# Patient Record
Sex: FEMALE | Race: WHITE | NOT HISPANIC OR LATINO | Employment: OTHER | URBAN - METROPOLITAN AREA
[De-identification: names, ages, dates, MRNs, and addresses within clinical notes are randomized per-mention and may not be internally consistent; named-entity substitution may affect disease eponyms.]

---

## 2017-12-27 ENCOUNTER — OFFICE VISIT (OUTPATIENT)
Dept: URGENT CARE | Facility: CLINIC | Age: 27
End: 2017-12-27
Payer: COMMERCIAL

## 2017-12-27 DIAGNOSIS — J02.9 ACUTE PHARYNGITIS: ICD-10-CM

## 2017-12-27 LAB — S PYO AG THROAT QL: NEGATIVE

## 2017-12-27 PROCEDURE — 99283 EMERGENCY DEPT VISIT LOW MDM: CPT

## 2017-12-27 PROCEDURE — 87430 STREP A AG IA: CPT

## 2017-12-27 PROCEDURE — G0382 LEV 3 HOSP TYPE B ED VISIT: HCPCS

## 2018-01-06 NOTE — PROGRESS NOTES
Assessment   1  Sinus pain (478 19) (J34 89)   2  Sore throat (462) (J02 9)    Plan   Sore throat    · (1) THROAT CULTURE (CULTURE, UPPER RESPIRATORY); Status:Active; Requested    for:[id number];    · Rapid StrepA- POC; Source:Throat; Status:Complete;   Done: [id number] 12:50PM    Discussion/Summary   Discussion Summary:    Follow up with pcp meds as directed flonase and zyrtec daily and motrin for fever and pain in two days for throat culture results  Medication Side Effects Reviewed: Possible side effects of new medications were reviewed with the patient/guardian today  Understands and agrees with treatment plan: The treatment plan was reviewed with the patient/guardian  The patient/guardian understands and agrees with the treatment plan    Counseling Documentation With Imm: The patient was counseled regarding instructions for management,-- patient and family education,-- importance of compliance with treatment  Follow Up Instructions: Follow Up with your Primary Care Provider in 1-2 days  If your symptoms worsen, go to the nearest [place] [id number] Emergency Department  Chief Complaint   Chief Complaint Free Text Note Form: sinus pressure, sore throat      History of Present Illness   HPI: 33 yo female who is here today for sinus pressure, sore throat HA, fever, chills, and body aches and sore throat which is worse  Fevers have diminished and wants to make sure she doesn't have strep throat  She has been progressively getting worse and has left ear popping but the sore throat is the worst  She has been using tylenol cold and flu, mucinex, and has not had a flu shot this season  She states that the only thing getting worse is her sore throat and would like to be checked for strep  Hospital Based Practices Required Assessment:      Pain Assessment      the patient states they have pain  Abuse And Domestic Violence Screen       Yes, the patient is safe at home  -- The patient states no one is hurting them  Depression And Suicide Screen  No, the patient has not had thoughts of hurting themself  No, the patient has not felt depressed in the past 7 days  Review of Systems   Focused-Female:      Constitutional: as noted in HPI       ENT: as noted in HPI  Cardiovascular: no complaints of slow or fast heart rate, no chest pain, no palpitations, no leg claudication or lower extremity edema  Respiratory: no complaints of shortness of breath, no wheezing, no dyspnea on exertion, no orthopnea or PND  ROS Reviewed:    ROS reviewed  Social History    · Denied: History of Illicit drug use   · Never a smoker   · Social drinker (V49 89) (Z78 9)  Social History Reviewed: The social history was reviewed and updated today  The social history was reviewed and is unchanged  Surgical History   1  Denied: History Of Prior Surgery  Surgical History Reviewed: The surgical history was reviewed and updated today  Allergies   1  No Known Drug Allergies    Vitals   Signs   Recorded: [id number] 12:08PM   Temperature: 97 6 F  Heart Rate: 75  Respiration: 18  Systolic: 772  Diastolic: 72  Height: 5 ft 7 in  Weight: 122 lb   BMI Calculated: 19 11  BSA Calculated: 1 64  O2 Saturation: 98    Physical Exam        Constitutional      General appearance: No acute distress, well appearing and well nourished  Eyes      Conjunctiva and lids: No swelling, erythema or discharge  Pupils and irises: Equal, round and reactive to light  Ears, Nose, Mouth, and Throat      External inspection of ears and nose: Normal        Otoscopic examination: Tympanic membranes translucent with normal light reflex  Canals patent without erythema  Nasal mucosa, septum, and turbinates: Normal without edema or erythema  Oropharynx: Normal with no erythema, edema, exudate or lesions  Pulmonary      Respiratory effort: No increased work of breathing or signs of respiratory distress  Auscultation of lungs: Clear to auscultation  Cardiovascular      Auscultation of heart: Normal rate and rhythm, normal S1 and S2, without murmurs  Lymphatic      Palpation of lymph nodes in neck: No lymphadenopathy  Musculoskeletal      Gait and station: Normal        Digits and nails: Normal without clubbing or cyanosis  Inspection/palpation of joints, bones, and muscles: Normal        Skin      Skin and subcutaneous tissue: Normal without rashes or lesions  Neurologic      Cranial nerves: Cranial nerves 2-12 intact  Reflexes: 2+ and symmetric  Sensation: No sensory loss  Psychiatric      Orientation to person, place, and time: Normal        Mood and affect: Normal        Results/Data   Rapid StrepA- POC [id number] 12:50PM Doyal Riser      Test Name Result Flag Reference   Rapid Strep Negative          Signatures    Electronically signed by :  JENNIE Aburto; Dec 27 2017 12:50PM EST                       (Author)     Electronically signed by : Bandar Vo DO; Jan 5 2018  5:15PM EST                       (Co-author)

## 2018-01-23 VITALS
SYSTOLIC BLOOD PRESSURE: 108 MMHG | WEIGHT: 122 LBS | BODY MASS INDEX: 19.15 KG/M2 | HEIGHT: 67 IN | OXYGEN SATURATION: 98 % | TEMPERATURE: 97.6 F | RESPIRATION RATE: 18 BRPM | DIASTOLIC BLOOD PRESSURE: 72 MMHG | HEART RATE: 75 BPM

## 2018-09-20 ENCOUNTER — OFFICE VISIT (OUTPATIENT)
Dept: OBGYN CLINIC | Facility: CLINIC | Age: 28
End: 2018-09-20
Payer: COMMERCIAL

## 2018-09-20 VITALS
WEIGHT: 131 LBS | HEIGHT: 67 IN | BODY MASS INDEX: 20.56 KG/M2 | DIASTOLIC BLOOD PRESSURE: 70 MMHG | SYSTOLIC BLOOD PRESSURE: 112 MMHG | HEART RATE: 82 BPM

## 2018-09-20 DIAGNOSIS — G89.29 CHRONIC MIDLINE LOW BACK PAIN WITHOUT SCIATICA: Primary | ICD-10-CM

## 2018-09-20 DIAGNOSIS — M54.50 CHRONIC MIDLINE LOW BACK PAIN WITHOUT SCIATICA: Primary | ICD-10-CM

## 2018-09-20 PROCEDURE — 99213 OFFICE O/P EST LOW 20 MIN: CPT | Performed by: ORTHOPAEDIC SURGERY

## 2018-09-20 NOTE — PROGRESS NOTES
Assessment:     1  Chronic midline low back pain without sciatica          Plan:     Problem List Items Addressed This Visit        Other    Chronic midline low back pain without sciatica - Primary     77-year-old female with low thoracic back pain  Physical examination she has significant tightness in her hamstrings and I think this is likely contributing to her back pain  I emphasized the importance of regular, diligent stretching  She was given a script for physical therapy  I will see her back in the future as needed  Relevant Orders    Ambulatory referral to Physical Therapy           Patient ID: Morris Martínez is a 29 y o  female  Chief Complaint:  Back pain    HPI:  77-year-old female with a two-month history of pain in her low back  This started sometime in July  She points to the center aspect of her lower thoracic spine as to where the pain is  More recently has started radiating out a little bit  She finds that stretching does help relieve it and she does yoga fairly regularly  She has been trying to do more stretches knee yoga, but finds that when she does then it seems like she has not done any stretching for several months  She has not had any significant changes in activity  Sitting up straight gives her sharp pain  She has not had any specific injuries  She denies numbness or tingling  She denies bowel or bladder symptoms  Lying flat on a bed gives her the most relief  More she is up either standing or sitting the more pain she gets  Patient's medical intake was reviewed  Allergy:  No Known Allergies    Medications:  all current active meds have been reviewed    Past Medical History:  History reviewed  No pertinent past medical history  Past Surgical History:  History reviewed  No pertinent surgical history      Family History:  Family History   Problem Relation Age of Onset    Fibromyalgia Mother        Social History:  History   Alcohol Use    Yes     Comment: social     History   Drug Use No     History   Smoking Status    Never Smoker   Smokeless Tobacco    Never Used           ROS:  Review of Systems   Constitutional: Negative  HENT: Negative  Eyes: Negative  Respiratory: Negative  Gastrointestinal: Negative  Endocrine: Positive for polydipsia  Genitourinary: Negative  Musculoskeletal: Positive for arthralgias and myalgias  Skin: Negative  Allergic/Immunologic: Negative  Neurological: Positive for headaches  Hematological: Negative  Psychiatric/Behavioral: Negative  Objective:  BP Readings from Last 1 Encounters:   09/20/18 112/70      Wt Readings from Last 1 Encounters:   09/20/18 59 4 kg (131 lb)        BMI:   Estimated body mass index is 20 52 kg/m² as calculated from the following:    Height as of this encounter: 5' 7" (1 702 m)  Weight as of this encounter: 59 4 kg (131 lb)  EXAM:   Physical Exam   Constitutional: She is oriented to person, place, and time  She appears well-developed and well-nourished  No distress  HENT:   Head: Normocephalic and atraumatic  Eyes: Right eye exhibits no discharge  Left eye exhibits no discharge  Neck: Normal range of motion  Neck supple  No tracheal deviation present  Cardiovascular: Normal rate and regular rhythm  Pulmonary/Chest: Effort normal and breath sounds normal  No respiratory distress  She exhibits no tenderness  Abdominal: Soft  She exhibits no distension  There is no tenderness  Neurological: She is alert and oriented to person, place, and time  Skin: Skin is warm and dry  She is not diaphoretic  No erythema  Psychiatric: She has a normal mood and affect  Her behavior is normal    Vitals reviewed  Back Exam     Tenderness   Back tenderness location: Tender to palpation over the lower thoracic vertebrae, no surrounding half paraspinal tenderness      Range of Motion   Extension: normal   Flexion: abnormal   Lateral Bend Right: normal   Lateral Bend Left: normal   Rotation Right: normal   Rotation Left: normal     Muscle Strength   The patient has normal back strength  Tests   Straight leg raise right: negative  Straight leg raise left: negative    Reflexes   Patellar: normal  Achilles: normal    Other   Toe Walk: normal  Heel Walk: normal  Sensation: normal  Gait: normal   Erythema: no back redness    Comments:  She with tight hamstring muscles, no clonus on lateral bend to the left patient has pain in her right hip area, bilateral hip internal rotation is 20° with external rotation of 40°              Radiographs:  I have personally reviewed pertinent films in PACS and my interpretation is No bony abnormalities on the lumbar and thoracic x-rays

## 2018-09-20 NOTE — ASSESSMENT & PLAN NOTE
70-year-old female with low thoracic back pain  Physical examination she has significant tightness in her hamstrings and I think this is likely contributing to her back pain  I emphasized the importance of regular, diligent stretching  She was given a script for physical therapy  I will see her back in the future as needed

## 2018-10-11 ENCOUNTER — EVALUATION (OUTPATIENT)
Dept: PHYSICAL THERAPY | Facility: CLINIC | Age: 28
End: 2018-10-11
Payer: COMMERCIAL

## 2018-10-11 DIAGNOSIS — G89.29 CHRONIC MIDLINE LOW BACK PAIN WITHOUT SCIATICA: ICD-10-CM

## 2018-10-11 DIAGNOSIS — M54.50 CHRONIC MIDLINE LOW BACK PAIN WITHOUT SCIATICA: ICD-10-CM

## 2018-10-11 PROCEDURE — G8991 OTHER PT/OT GOAL STATUS: HCPCS | Performed by: PHYSICAL THERAPIST

## 2018-10-11 PROCEDURE — 97161 PT EVAL LOW COMPLEX 20 MIN: CPT | Performed by: PHYSICAL THERAPIST

## 2018-10-11 PROCEDURE — G8992 OTHER PT/OT  D/C STATUS: HCPCS | Performed by: PHYSICAL THERAPIST

## 2018-10-11 PROCEDURE — G8990 OTHER PT/OT CURRENT STATUS: HCPCS | Performed by: PHYSICAL THERAPIST

## 2018-10-11 NOTE — PROGRESS NOTES
PT Evaluation/Discharge  *Addendum 18: Pt has been non-complaint with therapy and has not been in contact to set up any future appointments, therefore she is being discharged at this time  Today's date: 10/11/2018  Patient name: Rosemarie Wilkes  : 1990  MRN: [id number]  Referring provider: Siomara Macdonald MD  Dx:   Encounter Diagnosis     ICD-10-CM    1  Chronic midline low back pain without sciatica M54 5 Ambulatory referral to Physical Therapy    G89 29                   Assessment  Impairments: abnormal or restricted ROM, activity intolerance, lacks appropriate home exercise program and pain with function    Assessment details: Pt is a 29year old female presenting to outpatient Physical Therapy with primary complaints of low back and right hip pain  Pt presents with pain limiting left lateral flexion, tight R sided hamstring muscles, TTP of the R glute medius, TTP of T6-L3 Spinous processes, and overall decreased functional mobility  These physical deficits are preventing the patient from participating in usual activities such as performing lifting for work duties, and her usual exercise program   Pt would benefit from skilled PT services in order to address these deficits and reach maximum level of function  Thank you kindly for the referral!  Barriers to therapy: None  Understanding of Dx/Px/POC: good   Prognosis: good    Goals  ST  The patient will be fully compliant with HEP within two weeks    2  Pt will report decrease in pain by at least two points on the VAS within two weeks  LT  The patient will increase FOTO score to 74 within eight weeks  2  The patient will report full return to her regular exercise program indicating return to functional baseline within eight weeks  3 Pt will report ability to perform all work duties within eight weeks      Plan  Patient would benefit from: skilled physical therapy  Planned modality interventions: thermotherapy: hydrocollator packs, cryotherapy, ultrasound, traction and TENS  Planned therapy interventions: therapeutic activities, therapeutic exercise, home exercise program, manual therapy, joint mobilization, balance, patient education, neuromuscular re-education, stretching and strengthening  Frequency: 2x week  Duration in weeks: 8  Treatment plan discussed with: patient        Subjective Evaluation    History of Present Illness  Date of onset: 2018  Mechanism of injury: Pt reports that her low back and her right hip began hurting insidiously three months ago  Pt notes that her pain started a month after 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 after taking some time off  She says that some days the pain feels better, but on Monday morning when she woke up the pain was very intense  Pt reports that she previously saw Dr Laird Cowden who prescribed stretching exercises for her hamstrings, which she says have only helped her to a point  Not a recurrent problem   Pain  Current pain ratin  At best pain ratin  At worst pain ratin  Quality: pressure, sharp and pulling  Relieving factors: rest (Laying down, Stretching )  Aggravating factors: lifting (Sitting<>standing transfers, bending)  Progression: worsening      Diagnostic Tests  X-ray: normal  Patient Goals  Patient goals for therapy: decreased pain, increased motion and return to sport/leisure activities  Patient goal: 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, to be able to lift groceries, return to usual exercise activity         Objective     Palpation     Right Tenderness of the gluteus medius  Tenderness     Lumbar Spine  Tenderness in the spinous process       Additional Tenderness Details  Pt demonstrates TTP to the spinous processes of T9-L3    Neurological Testing     Sensation     Lumbar   Left   Intact: light touch    Right   Intact: light touch    Reflexes   Left   Patellar (L4): trace (1+)  Achilles (S1): normal (2+)    Right   Patellar (L4): trace (1+)  Achilles (S1): normal (2+)    Active Range of Motion     Lumbar   Flexion: WFL  Extension: WFL  Left lateral flexion: WFL and with pain  Right lateral flexion: WFL  Left rotation: WFL  Right rotation: [facility]    Strength/Myotome Testing     Left Hip   Planes of Motion   Flexion: 4+  Extension: 5  Abduction: 5  External rotation: 4+  Internal rotation: 4    Right Hip   Planes of Motion   Flexion: 4+  Extension: 5  Abduction: 5  External rotation: 4  Internal rotation: 4    Left Knee   Flexion: 5  Extension: 5    Right Knee   Flexion: 5    Left Ankle/Foot   Dorsiflexion: 5  Plantar flexion: 5    Right Ankle/Foot   Dorsiflexion: 5  Plantar flexion: 5    Tests     Right Hip   Negative GIACOMO, FADIR and piriformis  90/90 SLR: Positive  SLR: Negative  Additional Tests Details  Pt demonstrates normal flexibility of B ITB  Pt performed R sided loaded hip extension, R lateral L/S extension in standing,  lateral L/S extension in standing, prone press-ups x10  with no change in symptoms, indicating that she does not likely have an extension based preference of the L/S  Pt reported mild decrease in pain with L lumbar lateral flexion following lateral trunk rotation, indicating that she may have a rotational preference of the L/S at this time               Precautions: None    Daily Treatment Diary     Manual              Long Axis Distraction L Hip                                                                     Exercise Diary              Treadmill             Hip flexor stretch with stool             Hamstring stretch with stool             Standing Hip 4-way             Lat Stretch             LTR             SLS             Bridges with Micheline Worthy Dogs             Abdominal Bracing with TB             Shoulder ext/Rows with TB                                                                                                                                      Modalities              TENS Lumbar Traction

## 2018-10-18 ENCOUNTER — APPOINTMENT (OUTPATIENT)
Dept: PHYSICAL THERAPY | Facility: CLINIC | Age: 28
End: 2018-10-18
Payer: COMMERCIAL

## 2019-07-02 ENCOUNTER — HOSPITAL ENCOUNTER (OUTPATIENT)
Dept: RADIOLOGY | Facility: HOSPITAL | Age: 29
Discharge: HOME | End: 2019-07-02
Attending: RADIOLOGY

## 2019-07-02 DIAGNOSIS — Z76.89 REFERRAL OF PATIENT WITHOUT EXAMINATION OR TREATMENT: ICD-10-CM

## 2019-07-30 ENCOUNTER — OFFICE VISIT (OUTPATIENT)
Dept: FAMILY MEDICINE CLINIC | Facility: CLINIC | Age: 29
End: 2019-07-30
Payer: COMMERCIAL

## 2019-07-30 VITALS
HEART RATE: 67 BPM | TEMPERATURE: 97.4 F | HEIGHT: 67 IN | DIASTOLIC BLOOD PRESSURE: 72 MMHG | BODY MASS INDEX: 20.65 KG/M2 | WEIGHT: 131.6 LBS | SYSTOLIC BLOOD PRESSURE: 110 MMHG | RESPIRATION RATE: 16 BRPM | OXYGEN SATURATION: 99 %

## 2019-07-30 DIAGNOSIS — Z00.00 ANNUAL PHYSICAL EXAM: Primary | ICD-10-CM

## 2019-07-30 DIAGNOSIS — Z11.1 TUBERCULOSIS SCREENING: ICD-10-CM

## 2019-07-30 PROCEDURE — 99385 PREV VISIT NEW AGE 18-39: CPT | Performed by: FAMILY MEDICINE

## 2019-07-30 PROCEDURE — 86580 TB INTRADERMAL TEST: CPT | Performed by: FAMILY MEDICINE

## 2019-07-30 PROCEDURE — 3725F SCREEN DEPRESSION PERFORMED: CPT | Performed by: FAMILY MEDICINE

## 2019-07-30 NOTE — PROGRESS NOTES
ADULT ANNUAL PHYSICAL  [facility]    NAME: Courtney Peterson  AGE: 34 y o  SEX: female  : 1990     DATE: 2019     Assessment and Plan:     Problem List Items Addressed This Visit     None      Visit Diagnoses     Annual physical exam    -  Primary    Tuberculosis screening        Relevant Orders    TB Skin Test          Immunizations and preventive care screenings were discussed with patient today  Appropriate education was printed on patient's after visit summary  Counseling:  Dental Health: discussed importance of regular tooth brushing, flossing, and dental visits  Injury prevention: discussed safety/seat belts, safety helmets, smoke detectors, carbon dioxide detectors, and smoking near bedding or upholstery  · Sexual health: discussed sexually transmitted diseases, partner selection, use of condoms, avoidance of unintended pregnancy, and contraceptive alternatives  Return in 6 months (on 2020), or if symptoms worsen or fail to improve  Chief Complaint:     Chief Complaint   Patient presents with    Physical Exam     34year old complete physical       History of Present Illness:     Adult Annual Physical   Patient here for a comprehensive physical exam  The patient reports no problems  Diet and Physical Activity  · Diet/Nutrition: well balanced diet, heart healthy (low sodium) diet, low carb diet, consuming 3-5 servings of fruits/vegetables daily, limited fruits/vegetables and adequate fiber intake  · Exercise: no formal exercise  Depression Screening  PHQ-9 Depression Screening    PHQ-9:    Frequency of the following problems over the past two weeks:       Little interest or pleasure in doing things:  0 - not at all  Feeling down, depressed, or hopeless:  0 - not at all  PHQ-2 Score:  0       General Health  · Sleep: sleeps well and gets 7-8 hours of sleep on average     · Hearing: normal - bilateral   · Vision: goes for regular eye exams and wears glasses  · Dental: brushes teeth three times daily and flosses teeth occasionally  /GYN Health  · Last menstrual period: 7/22/2019  · Contraceptive method: barrier methods  · History of STDs?: no      Review of Systems:     Review of Systems   Constitutional: Negative  Negative for activity change, chills, fatigue and fever  HENT: Negative  Negative for congestion, ear discharge, ear pain, sinus pressure and trouble swallowing  Eyes: Negative  Negative for photophobia, redness and visual disturbance  Respiratory: Negative  Negative for cough, shortness of breath and wheezing  Cardiovascular: Negative  Negative for chest pain, palpitations and leg swelling  Gastrointestinal: Negative  Negative for blood in stool, constipation, diarrhea and nausea  Endocrine: Negative  Genitourinary: Negative  Negative for dysuria, flank pain, hematuria and urgency  Musculoskeletal: Negative  Negative for arthralgias and myalgias  Skin: Negative  Negative for rash  Allergic/Immunologic: Positive for environmental allergies  Neurological: Negative  Negative for dizziness, syncope and numbness  Hematological: Bruises/bleeds easily  Psychiatric/Behavioral: Negative  Negative for decreased concentration, sleep disturbance and suicidal ideas  The patient is not nervous/anxious  Past Medical History:     History reviewed  No pertinent past medical history     Past Surgical History:     Past Surgical History:   Procedure Laterality Date    NO PAST SURGERIES        Social History:     Social History     Socioeconomic History    Marital status: Single     Spouse name: None    Number of children: None    Years of education: None    Highest education level: None   Occupational History    None   Social Needs    Financial resource strain: None    Food insecurity:     Worry: None     Inability: None    Transportation needs:     Medical: None Non-medical: None   Tobacco Use    Smoking status: Never Smoker    Smokeless tobacco: Never Used   Substance and Sexual Activity    Alcohol use: Yes     Comment: social    Drug use: No    Sexual activity: None   Lifestyle    Physical activity:     Days per week: None     Minutes per session: None    Stress: None   Relationships    Social connections:     Talks on phone: None     Gets together: None     Attends [employer] service: None     Active member of club or organization: None     Attends meetings of clubs or organizations: None     Relationship status: None    Intimate partner violence:     Fear of current or ex partner: None     Emotionally abused: None     Physically abused: None     Forced sexual activity: None   Other Topics Concern    None   Social History Narrative    None      Family History:     Family History   Problem Relation Age of Onset    Fibromyalgia Mother       Current Medications:     No current outpatient medications on file  No current facility-administered medications for this visit  Allergies:     No Known Allergies   Physical Exam:     /72 (BP Location: Left arm, Patient Position: Sitting, Cuff Size: Standard)   Pulse 67   Temp (!) 97 4 °F (36 3 °C) (Tympanic)   Resp 16   Ht 5' 7" (1 702 m)   Wt 59 7 kg (131 lb 9 6 oz)   LMP 07/22/2019 (Exact Date)   SpO2 99%   Breastfeeding? No   BMI 20 61 kg/m²     Physical Exam   Constitutional: She is oriented to person, place, and time  She appears well-developed and well-nourished  No distress  HENT:   Head: Normocephalic and atraumatic  Right Ear: External ear normal    Left Ear: External ear normal    Nose: Nose normal    Mouth/Throat: Oropharynx is clear and moist  No oropharyngeal exudate  Eyes: Pupils are equal, round, and reactive to light  Conjunctivae and EOM are normal  Right eye exhibits no discharge  Left eye exhibits no discharge  No scleral icterus  Neck: Normal range of motion  Neck supple  No thyromegaly present  Cardiovascular: Normal rate, regular rhythm, normal heart sounds and intact distal pulses  No murmur heard  Pulmonary/Chest: Effort normal and breath sounds normal  No respiratory distress  She has no wheezes  She has no rales  Abdominal: Soft  Bowel sounds are normal  She exhibits no distension and no mass  There is no tenderness  There is no rebound and no guarding  Musculoskeletal: Normal range of motion  She exhibits no edema, tenderness or deformity  Lymphadenopathy:     She has no cervical adenopathy  Neurological: She is alert and oriented to person, place, and time  She displays normal reflexes  No cranial nerve deficit or sensory deficit  She exhibits normal muscle tone  Coordination normal    Skin: Skin is warm and dry  Capillary refill takes less than 2 seconds  No rash noted  She is not diaphoretic  No erythema  No pallor  Psychiatric: She has a normal mood and affect  Her behavior is normal  Judgment and thought content normal    Nursing note and vitals reviewed        CRISSY Dimas

## 2019-07-30 NOTE — PATIENT INSTRUCTIONS

## 2019-08-01 ENCOUNTER — CLINICAL SUPPORT (OUTPATIENT)
Dept: FAMILY MEDICINE CLINIC | Facility: CLINIC | Age: 29
End: 2019-08-01

## 2019-08-01 DIAGNOSIS — Z11.1 ENCOUNTER FOR PPD SKIN TEST READING: Primary | ICD-10-CM

## 2019-08-01 LAB
INDURATION: 0 MM
TB SKIN TEST: NEGATIVE

## 2020-11-21 ENCOUNTER — OFFICE VISIT (OUTPATIENT)
Dept: URGENT CARE | Facility: CLINIC | Age: 30
End: 2020-11-21
Payer: COMMERCIAL

## 2020-11-21 VITALS
RESPIRATION RATE: 16 BRPM | HEIGHT: 67 IN | DIASTOLIC BLOOD PRESSURE: 64 MMHG | SYSTOLIC BLOOD PRESSURE: 132 MMHG | OXYGEN SATURATION: 100 % | BODY MASS INDEX: 20.72 KG/M2 | TEMPERATURE: 98.7 F | WEIGHT: 132 LBS | HEART RATE: 76 BPM

## 2020-11-21 DIAGNOSIS — B37.3 VULVOVAGINAL CANDIDIASIS: Primary | ICD-10-CM

## 2020-11-21 PROCEDURE — G0382 LEV 3 HOSP TYPE B ED VISIT: HCPCS | Performed by: PHYSICIAN ASSISTANT

## 2020-11-21 PROCEDURE — 99283 EMERGENCY DEPT VISIT LOW MDM: CPT | Performed by: PHYSICIAN ASSISTANT

## 2020-11-21 RX ORDER — FLUCONAZOLE 150 MG/1
150 TABLET ORAL ONCE
Qty: 1 TABLET | Refills: 0 | Status: SHIPPED | OUTPATIENT
Start: 2020-11-21 | End: 2020-11-21

## 2021-04-09 DIAGNOSIS — Z23 ENCOUNTER FOR IMMUNIZATION: ICD-10-CM

## 2024-09-18 ENCOUNTER — TELEPHONE (OUTPATIENT)
Age: 34
End: 2024-09-18

## 2024-09-18 NOTE — TELEPHONE ENCOUNTER
Contacted patient in regards to email received by Izabel NELSON in attempts to verify patient's needs of services and add patient to proper wait list. Spoke to patient who stated that she was walking into a meeting and can give us a call back once she's done. IC provided her the call back number.    Attempt #1

## 2024-11-07 ENCOUNTER — ULTRASOUND (OUTPATIENT)
Dept: OBGYN CLINIC | Facility: CLINIC | Age: 34
End: 2024-11-07
Payer: COMMERCIAL

## 2024-11-07 VITALS
HEIGHT: 67 IN | SYSTOLIC BLOOD PRESSURE: 110 MMHG | WEIGHT: 140 LBS | BODY MASS INDEX: 21.97 KG/M2 | DIASTOLIC BLOOD PRESSURE: 62 MMHG

## 2024-11-07 DIAGNOSIS — N91.2 AMENORRHEA: Primary | ICD-10-CM

## 2024-11-07 PROCEDURE — 76817 TRANSVAGINAL US OBSTETRIC: CPT | Performed by: NURSE PRACTITIONER

## 2024-11-07 PROCEDURE — 99213 OFFICE O/P EST LOW 20 MIN: CPT | Performed by: NURSE PRACTITIONER

## 2024-11-07 NOTE — PATIENT INSTRUCTIONS
Please call with any concerns  Schedule 12 week ultrasound with MFM  A flu vaccine is recommended in pregnancy. You can get this through your PCP, any pharmacy or here in our office.  Return in 2 weeks for OB intake visit.  4 weeks for initial prenatal with provider.

## 2024-11-07 NOTE — PROGRESS NOTES
"[facility] - [place]  [address] [place] [address]    Assessment/Plan:  1. Amenorrhea  -     AMB US OB < 14 weeks single or first gestation level 1  -     Ambulatory Referral to Maternal Fetal Medicine; Future; Expected date: 2024  8 weeks 0 days, RAFFI 2025 by LMP, confirmed by US  Reviewed safe meds list  MFM referral initiated  Call with any concerns  Return visit in 2 weeks for OB intake          Subjective:   Ashleigh Lambert is a 34 y.o.  female.    HPI:   34 year old  presents to office with amenorrhea and + HPT. LMP 2024, placing her at 8 weeks 0 days gestation. Normally has regular cycles that are monthly. Feels well, mild cramping, no bleeding. Concerned about ectopic due to hx PID age 19.         Gyn History  Patient's last menstrual period was 2024 (exact date).       Last pap smear: Not on file    She  reports being sexually active and has had partner(s) who are male. She reports using the following method of birth control/protection: None.       OB History      Past Medical History:  No date: Ovarian cyst  No date: PID (acute pelvic inflammatory disease)  No date: Urinary tract infection  No date: Varicella  No date: Yeast infection     Past Surgical History:  No date: NO PAST SURGERIES     Social History     Tobacco Use    Smoking status: Never    Smokeless tobacco: Never   Vaping Use    Vaping status: Never Used   Substance Use Topics    Alcohol use: Not Currently     Comment: social    Drug use: No        No current outpatient medications on file.    She has No Known Allergies..    ROS: Review of Systems See HPI for details otherwise negative    Objective:  /62 (BP Location: Left arm, Patient Position: Sitting, Cuff Size: Standard)   Ht 5' 6.5\" (1.689 m)   Wt 63.5 kg (140 lb)   LMP 2024 (Exact Date)   Breastfeeding No   BMI 22.26 kg/m²      Physical Exam  Constitutional:       General: She is not in acute distress.     Appearance: " Normal appearance. She is not ill-appearing.   HENT:      Head: Normocephalic and atraumatic.   Abdominal:      General: There is no distension.      Palpations: Abdomen is soft.      Tenderness: There is no abdominal tenderness.   Musculoskeletal:         General: No swelling.   Skin:     General: Skin is warm and dry.   Neurological:      Mental Status: She is alert.   Psychiatric:         Thought Content: Thought content normal.       TVUS demonstrates a SIUP measuring 8 weeks 2 days by CRL, +CM noted.

## 2024-11-07 NOTE — PROGRESS NOTES
Ultrasound Probe Disinfection    A transvaginal ultrasound was performed.   Prior to use, disinfection was performed with High Level Disinfection Process ([employer])  Probe serial number [id number]:  [id number] was used    Nini Anderson MA  11/07/24  3:41 PM

## 2024-11-08 ENCOUNTER — TELEPHONE (OUTPATIENT)
Age: 34
End: 2024-11-08

## 2024-11-11 NOTE — TELEPHONE ENCOUNTER
Pt is scheduled for her Nuchal US 12/9 @ 2:30. Confirmed address of [facility].

## 2024-11-26 ENCOUNTER — TELEPHONE (OUTPATIENT)
Dept: OBGYN CLINIC | Facility: CLINIC | Age: 34
End: 2024-11-26

## 2024-11-26 ENCOUNTER — INITIAL PRENATAL (OUTPATIENT)
Dept: OBGYN CLINIC | Facility: CLINIC | Age: 34
End: 2024-11-26

## 2024-11-26 VITALS
DIASTOLIC BLOOD PRESSURE: 60 MMHG | BODY MASS INDEX: 21.79 KG/M2 | WEIGHT: 138.8 LBS | SYSTOLIC BLOOD PRESSURE: 100 MMHG | HEIGHT: 67 IN

## 2024-11-26 DIAGNOSIS — Z31.430 ENCOUNTER OF FEMALE FOR TESTING FOR GENETIC DISEASE CARRIER STATUS FOR PROCREATIVE MANAGEMENT: ICD-10-CM

## 2024-11-26 DIAGNOSIS — Z34.81 PRENATAL CARE, SUBSEQUENT PREGNANCY, FIRST TRIMESTER: Primary | ICD-10-CM

## 2024-11-26 PROCEDURE — NOBC: Performed by: NURSE PRACTITIONER

## 2024-11-26 NOTE — TELEPHONE ENCOUNTER
Patient informed of incorrect previous records scanned into her chart.  Will await her previous records.  She will verify insurance coverage for CF & SMA carrier screening with [facility].  She may decide to await records to see if testing done previously.  Lab order for [employer] CF/SMA carrier screening mailed to patient.

## 2024-11-26 NOTE — TELEPHONE ENCOUNTER
Left message x 2 patient's VM to recall office to discuss previous medical records.  Please transfer call to office (Amanda LE RN).

## 2024-11-26 NOTE — PROGRESS NOTES
"  OB INTAKE INTERVIEW  Patient is 34 y.o. who presents for OB intake at 10 wks 5 days  She is accompanied by herself during this encounter  The father of her baby (Josue Martinez \"Antoni\") is involved in the pregnancy and is 37 years old.      Last Menstrual Period: 2024  Ultrasound: Measured 8 weeks 2 days on 2024  Estimated Date of Delivery: 2025 confirmed by US    Signs/Symptoms of Pregnancy  Current pregnancy symptoms: nausea (decreased now), no vomiting, fatigue, urinary frequency, breast tenderness  Constipation YES - increase hydration, Miralax, Colace prn  Headaches YES x 2- not migraine-type presently, sometimes Tylenol effective  Cramping/spotting YES - cramping, pressure, no vaginal bleeding  PICA cravings no    Diabetes-  There is no height or weight on file to calculate BMI.  If patient has 1 or more, please order early 1 hour GTT  History of GDM no  BMI >35 no  History of PCOS or current metformin use no  History of LGA/macrosomic infant (4000g/9lbs) no    If patient has 2 or more, please order early 1 hour GTT  BMI>30 no  AMA YES  First degree relative with type 2 diabetes no  History of chronic HTN, hyperlipidemia, elevated A1C no  High risk race (, , ,  or ) no    Hypertension- if you answer yes to any of the following, please order baseline preeclampsia labs (cbc, comprehensive metabolic panel, urine protein creatinine ratio, uric acid)  History of of chronic HTN no  History of gestational HTN no  History of preeclampsia, eclampsia, or HELLP syndrome no  History of diabetes no  History of lupus,sjogrens syndrome, kidney disease no    Thyroid- if yes order TSH with reflex T4  History of thyroid disease no    Bleeding Disorder or Hx of DVT-patient or first degree relative with history of. Order the following if not done previously.   (Factor V, antithrombin III, prothrombin gene mutation, protein C and S Ag, lupus " anticoagulant, anticardiolipin, beta-2 glycoprotein)   no    OB/GYN-  History of abnormal pap smear no       Date of last pap smear 2023 (wnl per patient - [place])  History of HPV no  History of Herpes/HSV no  History of other STI (gonorrhea, chlamydia, trich) no  History of prior  YES  History of prior  no  History of  delivery prior to 36 weeks 6 days no  History of Varicella or Vaccination [t had chickenpox in childhood  History of blood transfusion no  Ok for blood transfusion YES    Substance screening-   History of tobacco use no  Currently using tobacco no  Substance Use Screen Level (N/A, LOW, HIGH) N/A    MRSA Screening-   Does the pt have a hx of MRSA? no    Immunizations:  Influenza vaccine given this season NO - patient usually does not get flu vaccine - recommended in pregnancy  Discussed Tdap vaccine YES  Discussed COVID Vaccine - patient had Covid vaccine x 2 + boosters (up to date), patient had Covid x 1    Genetic/MFM-  Do you or your partner have a history of any of the following in yourselves or first degree relatives?  Cystic fibrosis no  Spinal muscular atrophy no  Hemoglobinopathy/Sickle Cell/Thalassemia no  Fragile X Intellectual Disability no    If yes, discuss Carrier Screening and recommend consultation with MFM/Genetic Counseling and place specific [facility] Referral for.    If no, discuss Carrier Screening being completed once in a lifetime as a standard of care lab test. Place orders for Cystic Fibrosis Gene Test (OEN314) and Spinal Muscular Atrophy DNA (FCV2552    Appointment for Nuchal Translucency Ultrasound at [facility] scheduled for 2024 - discussed NIPT/MSAFP      Interview education  [facility] P[facility] Essentials Book reviewed, discussed and attached to their AVS YES    Nurse/Family Partnership- patient may qualify; referral placed NO    Prenatal lab work scripts YES ([facility])  Extra labs ordered:  CF/SMA carrier screening    Aspirin/Preeclampsia  Screen    Risk Level Risk Factor Recommendation   LOW Prior Uncomplicated full-term delivery YES No Aspirin recommendation        MODERATE Nulliparity no Recommend low-dose aspirin if     BMI>30 no 2 or more moderate risk factors    Family History Preeclampsia (mother/sister) no     35yr old or greater YES     Black Race, Concern for SDOH/Low Socioeconomic no     IVF Pregnancy  no     Personal History Risks (low birth weight, prior adverse preg outcome, >10yr preg interval) YES         HIGH History of Preeclampsia no Recommend low-dose aspirin if     Multifetal gestation no 1 or more high risk factors    Chronic HTN no     Type 1 or 2 Diabetes no     Renal Disease no     Autoimmune Disease  no      Contraindications to ASA therapy:  NSAID/ ASA allergy: no  Nasal polyps: no  Asthma with history of ASA induced bronchospasm: no  Relative contraindications:  History of GI bleed: no  Active peptic ulcer disease: no  Severe hepatic dysfunction: no    Patient should be recommended to take ASA 162mg during this pregnancy from 12-36wks to lower her risk of preeclampsia: MODERATE RISK per screening protocol - recommended LDASA 162 mg/day from wk 12-36          The patient has a history now or in prior pregnancy notable for:    - long interval pregnancy - 1st  3/6/2010 ([place]) - previous records from [place] scanned into chart are not this patient's records - different middle name & different , different pregnancy/delivery records - office clerical staff (Ashkan) notified 2024 by MIMI Aguilar  - 1st pregnancy with FOB (his first)  - hx PID (age 19)  - hx migraine h/a      Details that I feel the provider should be aware of:   - patient is up to date on q 6 month dental cleanings  - plans air travel (work) [facility] 3/2025 - [place]  - 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 The patient was oriented to our  practice, the navigator role, reviewed delivering physicians and [facility] for Delivery. All questions were answered.    Interviewed by: LIVIA Hodge RN

## 2024-11-26 NOTE — PATIENT INSTRUCTIONS
Congratulation!! Please review our Pregnancy Essential Guide and [facility] L&D Virtual tour from our networks website.     [facility] Guide  [facility] ([email or web address])     [facility] - Virtual Tour ([employer])  Yemi Sotelo,     It was so nice getting to know you today. Remember if you have an urgent or time sensitive concern, please call the practice phone number so a clinical triage team member can review your symptoms and get in touch with our on call provider if necessary. If you have general questions or need help navigating our services please REPLY to this message so it comes directly to me and I will respond between other patients. If I am out of the office for any reason, another nurse navigator may reach out to help you. Our Pregnancy Essential Guide is a great online resource--please use the link below.     [facility] P[facility]entials Guide  [facility] ([email or web address])     Again, Congratulations and Thank You for choosing [facility]. I look forward to helping you through this journey. Reach out -   D JAZMÍN Hodge

## 2024-12-02 ENCOUNTER — TELEPHONE (OUTPATIENT)
Age: 34
End: 2024-12-02

## 2024-12-02 NOTE — TELEPHONE ENCOUNTER
Pt called in stating she has appointment for NT at [facility] on 12/9/24 and then her initial OB visit at [facility] the following day. Wondering if she will be getting US at both. Advised the NT will be an US but at the OB office, she will get dopplers for fetal heart tones. She verbalized understanding and is thankful. Pt wondering about costs for various procedures, provided information. States her insurance will not cover the NIPT and will cost over $700, reviewed it is an optional test. She verbalized understanding and is thankful.

## 2024-12-07 LAB
EXTERNAL ANTIBODY SCREEN: NORMAL
EXTERNAL HEMOGLOBIN: 12.1 G/DL
EXTERNAL HEPATITIS B SURFACE ANTIGEN: NEGATIVE
EXTERNAL HIV-1/2 AB-AG: NORMAL
EXTERNAL PLATELET COUNT: 238 K/ΜL
EXTERNAL RH FACTOR: NEGATIVE
EXTERNAL RUBELLA IGG QUANTITATION: 2.71
EXTERNAL SYPHILIS TOTAL IGG/IGM SCREENING: NON REACTIVE

## 2024-12-09 ENCOUNTER — ROUTINE PRENATAL (OUTPATIENT)
Dept: PERINATAL CARE | Facility: OTHER | Age: 34
End: 2024-12-09
Payer: COMMERCIAL

## 2024-12-09 VITALS
HEIGHT: 67 IN | HEART RATE: 74 BPM | DIASTOLIC BLOOD PRESSURE: 64 MMHG | WEIGHT: 141.8 LBS | SYSTOLIC BLOOD PRESSURE: 126 MMHG | BODY MASS INDEX: 22.26 KG/M2

## 2024-12-09 DIAGNOSIS — Z36.82 ENCOUNTER FOR NUCHAL TRANSLUCENCY TESTING: ICD-10-CM

## 2024-12-09 DIAGNOSIS — O09.521 SUPERVISION OF ELDERLY MULTIGRAVIDA IN FIRST TRIMESTER: Primary | ICD-10-CM

## 2024-12-09 DIAGNOSIS — N91.2 AMENORRHEA: ICD-10-CM

## 2024-12-09 DIAGNOSIS — O09.521 MULTIGRAVIDA OF ADVANCED MATERNAL AGE IN FIRST TRIMESTER: ICD-10-CM

## 2024-12-09 DIAGNOSIS — Z3A.12 12 WEEKS GESTATION OF PREGNANCY: ICD-10-CM

## 2024-12-09 PROCEDURE — 76813 OB US NUCHAL MEAS 1 GEST: CPT | Performed by: OBSTETRICS & GYNECOLOGY

## 2024-12-09 PROCEDURE — 76801 OB US < 14 WKS SINGLE FETUS: CPT | Performed by: OBSTETRICS & GYNECOLOGY

## 2024-12-09 PROCEDURE — 99203 OFFICE O/P NEW LOW 30 MIN: CPT | Performed by: OBSTETRICS & GYNECOLOGY

## 2024-12-09 PROCEDURE — 36415 COLL VENOUS BLD VENIPUNCTURE: CPT | Performed by: OBSTETRICS & GYNECOLOGY

## 2024-12-09 RX ORDER — ASPIRIN 81 MG/1
81 TABLET, CHEWABLE ORAL DAILY
COMMUNITY

## 2024-12-09 NOTE — PROGRESS NOTES
"Mindy presents today for a genetic screening ultrasound.  This is her second pregnancy.  First pregnancy resulted in a full-term vaginal livery in 2010 without apparent complications.  She has no other significant contributory medical, surgical, substance use, or family history.  A review of systems is otherwise negative.    We discussed the options for genetic screening, including but not limited to first trimester screening, second trimester screening, combined first and second trimester screening, noninvasive prenatal screening (NIPS) for patients at high risk and diagnostic screening through the use of CVS and amniocentesis. We discussed the risks and benefits of each approach including the sensitivities and false positive rates as well as the difference between a screening test and a diagnostic test. At the conclusion of our discussion the patient elected noninvasive prenatal screening utilizing the MaterniT 21 plus test. The patient had this bloowork drawn in the office.   The results should be available in approximately 7-10 days.    We discussed follow-up in detail and I recommend an anatomy ultrasound be scheduled for 20 weeks gestation.    Thank you very much for allowing us to participate in the care of this very nice patient. Should you have any questions, please do not hesitate to contact me.    Portions of the record may have been created with voice recognition software. Occasional wrong word or \"sound a like\" substitutions may have occurred due to the inherent limitations of voice recognition software. Read the chart carefully and recognize, using context, where substitutions have occurred.  "

## 2024-12-09 NOTE — PROGRESS NOTES
Patient chose to have [facility] [id number] Non-Invasive Prenatal Screen [id number] PLUS w/ SCA, WITH fetal sex (per pt request).  Patient choose to be billed through insurance. Prior Authorization approval #[id number].    Patient given brochure and is aware [facility] will contact patient's insurance and coordinate coverage.  Provided [facility] contact information. General inquiries [phone], Cost estimates [phone] and [facility] Billing [phone]. Website [email or web address].     Blood collection tubes labeled with patient identifiers (name, medical record number, and date of birth).     Filled out [facility] order form. Patient chose to have blood drawn in our office at time of visit. NIPS was drawn from right arm with a butterfly needle by A Anthony NOYOLA. .      If patient chose to have blood work drawn at a [facility] lab we requested patient notify MFM (via phone call or [employer] message) when blood collected so office can follow up on results.       Maternal Fetal Medicine will have results in approximately 5-7 business days and will call patient or notify via [employer].  Patient aware viewing lab result online will reveal fetal sex if ordered.    Patient verbalized understanding of all instructions and no questions at this time. ;

## 2024-12-10 ENCOUNTER — INITIAL PRENATAL (OUTPATIENT)
Dept: OBGYN CLINIC | Facility: CLINIC | Age: 34
End: 2024-12-10

## 2024-12-10 VITALS
HEIGHT: 67 IN | DIASTOLIC BLOOD PRESSURE: 70 MMHG | BODY MASS INDEX: 22.13 KG/M2 | WEIGHT: 141 LBS | SYSTOLIC BLOOD PRESSURE: 114 MMHG

## 2024-12-10 DIAGNOSIS — O09.521 MULTIGRAVIDA OF ADVANCED MATERNAL AGE IN FIRST TRIMESTER: ICD-10-CM

## 2024-12-10 DIAGNOSIS — Z3A.12 12 WEEKS GESTATION OF PREGNANCY: Primary | ICD-10-CM

## 2024-12-10 DIAGNOSIS — Z36.1 NEED FOR MATERNAL SERUM ALPHA-PROTEIN (MSAFP) SCREENING: ICD-10-CM

## 2024-12-10 LAB
EXTERNAL CHLAMYDIA SCREEN: NEGATIVE
EXTERNAL GONORRHEA SCREEN: NEGATIVE

## 2024-12-10 PROCEDURE — PNV: Performed by: NURSE PRACTITIONER

## 2024-12-10 NOTE — ASSESSMENT & PLAN NOTE
Labs drawn, pending  Declines flu vaccine  NIPT drawn, AFP ordered, discussed timing  Need to obtain records, specifically SMA, CF. Pt may have done if not done previously.

## 2024-12-10 NOTE — PROGRESS NOTES
"Routine Prenatal Visit  [facility] - [place]  [address], [place] [address]    Assessment/Plan:  Ashleigh is a 34 y.o. year old  at 12w5d who presents for routine prenatal visit.     1. 12 weeks gestation of pregnancy  Assessment & Plan:  Labs drawn, pending  Declines flu vaccine  NIPT drawn, AFP ordered, discussed timing  Need to obtain records, specifically SMA, CF. Pt may have done if not done previously.  Orders:  -     IGP,CtNg,AptimaHPV,rfx16/18,45  2. Need for maternal serum alpha-protein (MSAFP) screening  -     Alpha fetoprotein, maternal; Future  -     Alpha fetoprotein, maternal  3. Multigravida of advanced maternal age in first trimester  Assessment & Plan:  Taking low dose ASA therapy for AMA, >10 years since last pregnancy, continue until 36 weeks.      Next OB Visit 4 weeks.    Subjective:     CC: Prenatal care    Ashleigh Lambert is a 34 y.o.  female who presents for routine prenatal care at 12w5d.  Pregnancy ROS: no leakage of fluid, pelvic pain, or vaginal bleeding.  no fetal movement yet.    The following portions of the patient's history were reviewed and updated as appropriate: allergies, current medications, past family history, past medical history, obstetric history, gynecologic history, past social history, past surgical history and problem list.      Objective:  /70 (BP Location: Right arm, Patient Position: Sitting, Cuff Size: Standard)   Ht 5' 7\" (1.702 m)   Wt 64 kg (141 lb)   LMP 2024 (Exact Date)   BMI 22.08 kg/m²   Pregravid Weight/BMI: 61.2 kg (135 lb) (BMI 21.14)  Current Weight: 64 kg (141 lb)   Total Weight Gain: 2.722 kg (6 lb)   Pre- Vitals      Flowsheet Row Most Recent Value   Prenatal Assessment    Fetal Heart Rate 161   Fundal Height (cm) 12 cm   Movement Absent   Prenatal Vitals    Blood Pressure 114/70   Weight - Scale 64 kg (141 lb)   Urine Albumin/Glucose    Dilation/Effacement/Station    Cervical Dilation 0   Cervical " Effacement 0   Vaginal Drainage    Draining Fluid No   Edema    LLE Edema None   RLE Edema None   Facial Edema None             General: Well appearing, no distress  Abdomen: Soft, gravid, nontender  Extremities: Non tender.

## 2024-12-13 LAB
ABO GROUP BLD: NORMAL
APPEARANCE UR: CLEAR
BACTERIA UR CULT: NO GROWTH
BACTERIA UR CULT: NORMAL
BACTERIA URNS QL MICRO: NORMAL
BASOPHILS # BLD AUTO: 0 X10E3/UL (ref 0–0.2)
BASOPHILS NFR BLD AUTO: 1 %
BILIRUB UR QL STRIP: NEGATIVE
BLD GP AB SCN SERPL QL: NEGATIVE
C TRACH RRNA SPEC QL NAA+PROBE: NORMAL
CASTS URNS QL MICRO: NORMAL /LPF
CFDNA.FET/CFDNA.TOTAL SFR FETUS: NORMAL %
CITATION REF LAB TEST: NORMAL
COLOR UR: YELLOW
EOSINOPHIL # BLD AUTO: 0.1 X10E3/UL (ref 0–0.4)
EOSINOPHIL NFR BLD AUTO: 1 %
EPI CELLS #/AREA URNS HPF: NORMAL /HPF (ref 0–10)
ERYTHROCYTE [DISTWIDTH] IN BLOOD BY AUTOMATED COUNT: 12.3 % (ref 11.7–15.4)
FET 13+18+21+X+Y ANEUP PLAS.CFDNA: NEGATIVE
FET CHR 21 TS PLAS.CFDNA QL: NEGATIVE
FET CHR 21 TS PLAS.CFDNA QL: NEGATIVE
FET MS X RISK WBC.DNA+CFDNA QL: NOT DETECTED
FET SEX PLAS.CFDNA DOSAGE CFDNA: NORMAL
FET TS 13 RISK PLAS.CFDNA QL: NEGATIVE
FET X + Y ANEUP RISK PLAS.CFDNA SEQ-IMP: NOT DETECTED
GA EST FROM CONCEPTION DATE: NORMAL D
GESTATIONAL AGE > 9:: YES
GLUCOSE UR QL: NEGATIVE
HBV SURFACE AG SERPL QL IA: NEGATIVE
HCT VFR BLD AUTO: 36 % (ref 34–46.6)
HCV AB S/CO SERPL IA: NON REACTIVE
HGB BLD-MCNC: 12.1 G/DL (ref 11.1–15.9)
HGB UR QL STRIP: NEGATIVE
HIV 1+2 AB+HIV1 P24 AG SERPL QL IA: NON REACTIVE
IMM GRANULOCYTES # BLD: 0 X10E3/UL (ref 0–0.1)
IMM GRANULOCYTES NFR BLD: 0 %
KETONES UR QL STRIP: NEGATIVE
LAB DIRECTOR NAME PROVIDER: NORMAL
LAB DIRECTOR NAME PROVIDER: NORMAL
LABORATORY COMMENT REPORT: NORMAL
LEUKOCYTE ESTERASE UR QL STRIP: NEGATIVE
LIMITATIONS OF THE TEST: NORMAL
LYMPHOCYTES # BLD AUTO: 1.9 X10E3/UL (ref 0.7–3.1)
LYMPHOCYTES NFR BLD AUTO: 22 %
MCH RBC QN AUTO: 29.4 PG (ref 26.6–33)
MCHC RBC AUTO-ENTMCNC: 33.6 G/DL (ref 31.5–35.7)
MCV RBC AUTO: 88 FL (ref 79–97)
MICRO URNS: NORMAL
MICRO URNS: NORMAL
MONOCYTES # BLD AUTO: 0.6 X10E3/UL (ref 0.1–0.9)
MONOCYTES NFR BLD AUTO: 7 %
N GONORRHOEA RRNA SPEC QL NAA+PROBE: NORMAL
NEGATIVE PREDICTIVE VALUE: NORMAL
NEUTROPHILS # BLD AUTO: 5.7 X10E3/UL (ref 1.4–7)
NEUTROPHILS NFR BLD AUTO: 69 %
NITRITE UR QL STRIP: NEGATIVE
PERFORMANCE CHARACTERISTICS: NORMAL
PH UR STRIP: 7 [PH] (ref 5–7.5)
PLATELET # BLD AUTO: 238 X10E3/UL (ref 150–450)
POSITIVE PREDICTIVE VALUE: NORMAL
PROT UR QL STRIP: NEGATIVE
RBC # BLD AUTO: 4.11 X10E6/UL (ref 3.77–5.28)
RBC #/AREA URNS HPF: NORMAL /HPF (ref 0–2)
REF LAB TEST METHOD: NORMAL
RH BLD: POSITIVE
RPR SER QL: NON REACTIVE
RUBV IGG SERPL IA-ACNC: 2.71 INDEX
SL AMB INTERPRETATION: NORMAL
SL AMB NOTE:: NORMAL
SP GR UR: 1.01 (ref 1–1.03)
TEST PERFORMANCE INFO SPEC: NORMAL
UROBILINOGEN UR STRIP-ACNC: 0.2 MG/DL (ref 0.2–1)
WBC # BLD AUTO: 8.4 X10E3/UL (ref 3.4–10.8)
WBC #/AREA URNS HPF: NORMAL /HPF (ref 0–5)

## 2024-12-14 LAB
C TRACH RRNA CVX QL NAA+PROBE: NEGATIVE
CYTOLOGIST CVX/VAG CYTO: NORMAL
DX ICD CODE: NORMAL
HPV GENOTYPE REFLEX: NORMAL
HPV I/H RISK 4 DNA CVX QL PROBE+SIG AMP: NEGATIVE
N GONORRHOEA RRNA CVX QL NAA+PROBE: NEGATIVE
OTHER STN SPEC: NORMAL
PATH REPORT.FINAL DX SPEC: NORMAL
SL AMB CLINICAL HISTORY: NORMAL
SL AMB NOTE:: NORMAL
SL AMB SPECIMEN ADEQUACY: NORMAL
SL AMB TEST METHODOLOGY: NORMAL

## 2024-12-16 ENCOUNTER — RESULTS FOLLOW-UP (OUTPATIENT)
Dept: OBGYN CLINIC | Facility: CLINIC | Age: 34
End: 2024-12-16

## 2024-12-16 ENCOUNTER — RESULTS FOLLOW-UP (OUTPATIENT)
Dept: PERINATAL CARE | Facility: OTHER | Age: 34
End: 2024-12-16

## 2024-12-16 NOTE — RESULT ENCOUNTER NOTE
I have reviewed the results of the NIPS which are low risk.  Please call patient and notify her of these reassuring results if she has not viewed on MyChart. Please ensure she is notified of recommendation of MSAFP to be ordered and followed up through her primary Obstetrician's office.      Thank you, Dante Espana MD

## 2024-12-17 ENCOUNTER — RESULTS FOLLOW-UP (OUTPATIENT)
Dept: OBGYN CLINIC | Facility: CLINIC | Age: 34
End: 2024-12-17

## 2025-01-07 ENCOUNTER — ROUTINE PRENATAL (OUTPATIENT)
Dept: OBGYN CLINIC | Facility: CLINIC | Age: 35
End: 2025-01-07
Payer: COMMERCIAL

## 2025-01-07 VITALS
HEIGHT: 67 IN | WEIGHT: 143 LBS | DIASTOLIC BLOOD PRESSURE: 62 MMHG | SYSTOLIC BLOOD PRESSURE: 116 MMHG | BODY MASS INDEX: 22.44 KG/M2

## 2025-01-07 DIAGNOSIS — O09.521 MULTIGRAVIDA OF ADVANCED MATERNAL AGE IN FIRST TRIMESTER: ICD-10-CM

## 2025-01-07 DIAGNOSIS — Z3A.16 16 WEEKS GESTATION OF PREGNANCY: Primary | ICD-10-CM

## 2025-01-07 LAB
SL AMB  POCT GLUCOSE, UA: NORMAL
SL AMB POCT URINE PROTEIN: NORMAL

## 2025-01-07 PROCEDURE — 81002 URINALYSIS NONAUTO W/O SCOPE: CPT | Performed by: NURSE PRACTITIONER

## 2025-01-07 PROCEDURE — PNV: Performed by: NURSE PRACTITIONER

## 2025-01-07 RX ORDER — LORATADINE 10 MG/1
10 TABLET, ORALLY DISINTEGRATING ORAL DAILY
COMMUNITY

## 2025-01-07 NOTE — PROGRESS NOTES
"Routine Prenatal Visit  [facility] - [place]  [address], [place] [address]    Assessment/Plan:  Ashleigh is a 34 y.o. year old  at 16w5d who presents for routine prenatal visit.     1. 16 weeks gestation of pregnancy  Assessment & Plan:  AFP ordered, discussed timing  Anatomy scan scheduled  Pt to obtain records of CF, SMA from previous provider  Discussed round ligament pain, comfort measures.  Orders:  -     POCT urine dip  2. Multigravida of advanced maternal age in first trimester  Assessment & Plan:  Continue LDASA therapy      Next OB Visit 4 weeks.    Subjective:     CC: Prenatal care    Ashleigh Lambert is a 34 y.o.  female who presents for routine prenatal care at 16w5d.  Feels well, no complaints except occasional pain in lower quadrants of abdomen.  Pregnancy ROS: no leakage of fluid, pelvic pain, or vaginal bleeding.  normal fetal movement.    The following portions of the patient's history were reviewed and updated as appropriate: allergies, current medications, past family history, past medical history, obstetric history, gynecologic history, past social history, past surgical history and problem list.      Objective:  /62 (BP Location: Left arm, Patient Position: Sitting, Cuff Size: Standard)   Ht 5' 7\" (1.702 m)   Wt 64.9 kg (143 lb)   LMP 2024 (Exact Date)   BMI 22.40 kg/m²   Pregravid Weight/BMI: 61.2 kg (135 lb) (BMI 21.14)  Current Weight: 64.9 kg (143 lb)   Total Weight Gain: 3.629 kg (8 lb)   Pre-Denise Vitals      Flowsheet Row Most Recent Value   Prenatal Assessment    Fetal Heart Rate 159   Fundal Height (cm) 16 cm   Movement Present   Prenatal Vitals    Blood Pressure 116/62   Weight - Scale 64.9 kg (143 lb)   Urine Albumin/Glucose    Dilation/Effacement/Station    Vaginal Drainage    Draining Fluid No   Edema    LLE Edema None   RLE Edema None             General: Well appearing, no distress  Abdomen: Soft, gravid, nontender  Extremities: Non " tender.

## 2025-01-07 NOTE — ASSESSMENT & PLAN NOTE
AFP ordered, discussed timing  Anatomy scan scheduled  Pt to obtain records of CF, SMA from previous provider  Discussed round ligament pain, comfort measures.

## 2025-01-07 NOTE — PATIENT INSTRUCTIONS
Please go to the lab for your AFP (screening for neural tube defect) between now and 20 weeks  Please call with any concerns

## 2025-01-16 ENCOUNTER — TELEPHONE (OUTPATIENT)
Dept: OBGYN CLINIC | Facility: CLINIC | Age: 35
End: 2025-01-16

## 2025-01-16 LAB
2ND TRIMESTER 4 SCREEN SERPL-IMP: NORMAL
AFP ADJ MOM SERPL: 1.36
AFP INTERP AMN-IMP: NORMAL
AFP INTERP SERPL-IMP: NORMAL
AFP INTERP SERPL-IMP: NORMAL
AFP SERPL-MCNC: 57.6 NG/ML
AGE AT DELIVERY: 35.1 YR
GA METHOD: NORMAL
GA: 17.6 WEEKS
IDDM PATIENT QL: NO
MULTIPLE PREGNANCY: NO
NEURAL TUBE DEFECT RISK FETUS: 4034 %

## 2025-01-16 NOTE — TELEPHONE ENCOUNTER
Second Trimester Calls:    Overall how are you doing? Pt states overall everything is well and no concerns.     Compliant with routine OB care appointments? Yes    Have you completed your 1st trimester labs? yes    If you had NIPS with MFM, do you have a order for MSAFP? Yes, completed    Can be completed 15w-22w6d, ideally 16w-18w    Have you seen MFM and do you have your detailed US scheduled? 02/07/25    Pregnancy Education-have you had a chance to review the classes offered and registered? Pt is planning on registering for the tour of the hospital and was thinking about the infant CPR.

## 2025-02-05 ENCOUNTER — ROUTINE PRENATAL (OUTPATIENT)
Dept: OBGYN CLINIC | Facility: CLINIC | Age: 35
End: 2025-02-05
Payer: COMMERCIAL

## 2025-02-05 VITALS
WEIGHT: 150 LBS | DIASTOLIC BLOOD PRESSURE: 62 MMHG | BODY MASS INDEX: 23.54 KG/M2 | SYSTOLIC BLOOD PRESSURE: 100 MMHG | HEIGHT: 67 IN

## 2025-02-05 DIAGNOSIS — Z3A.20 20 WEEKS GESTATION OF PREGNANCY: ICD-10-CM

## 2025-02-05 DIAGNOSIS — O09.521 MULTIGRAVIDA OF ADVANCED MATERNAL AGE IN FIRST TRIMESTER: Primary | ICD-10-CM

## 2025-02-05 LAB
SL AMB  POCT GLUCOSE, UA: NORMAL
SL AMB POCT URINE PROTEIN: NORMAL

## 2025-02-05 PROCEDURE — 81002 URINALYSIS NONAUTO W/O SCOPE: CPT | Performed by: STUDENT IN AN ORGANIZED HEALTH CARE EDUCATION/TRAINING PROGRAM

## 2025-02-05 PROCEDURE — PNV: Performed by: STUDENT IN AN ORGANIZED HEALTH CARE EDUCATION/TRAINING PROGRAM

## 2025-02-05 NOTE — PROGRESS NOTES
"Routine Prenatal Visit  [facility] - [place]  [address] [place] [address]  Assessment & Plan  20 weeks gestation of pregnancy  - Labs up to date   - Genetic testing: low risk with negative AFP screen   - Level II US scheduled for 25   - Vaccinations: declines flu vaccine    - RTC for 24 week visit     Exam benign. Discussed possible MSK related pain, will trial sitz baths in mean time, also discussed pelvic floor PT if persistent. Has MFM appt coming up for level II US as well     Orders:    POCT urine dip    Multigravida of advanced maternal age in first trimester  - S/p MFM consult   - Continues on ASA therapy        Subjective:   Ashleigh Lambert is a 34 y.o.  who presents for routine prenatal care at 20w6d.  Complaints today:     Reports 1x weekly internal pain. Has been ongoing for couple of months. Standing makes the pain worse. Sitting helps. She feels like she may be dilating. No abnormal discharge. No bleeding. No urinary complaints. Could be related to intercourse.     LOF: no; VB: no; Contractions: no; FM: N/A    Objective:  /62 (BP Location: Right arm, Patient Position: Sitting, Cuff Size: Standard)   Ht 5' 7\" (1.702 m)   Wt 68 kg (150 lb)   LMP 2024 (Exact Date)   BMI 23.49 kg/m²     General: Well appearing, no distress  Respiratory: Unlabored breathing  Cardiovascular: Regular rate.  Abdomen: Soft, gravid, nontender  Extremities: Warm and well perfused.  Non tender.    Pregravid Weight/BMI: 61.2 kg (135 lb) (BMI 21.14)  Current Weight: 68 kg (150 lb)   Total Weight Gain: 6.804 kg (15 lb)     Sterile speculum with no pooling, no bleeding, no abnormal discharge   Cervix visually closed and devoid of lesions   On digital examination cervix closed, no pain reproducible     Pre-Denise Vitals      Flowsheet Row Most Recent Value   Prenatal Assessment    Fetal Heart Rate 150   Prenatal Vitals    Blood Pressure 100/62   Weight - Scale 68 kg (150 lb)   Urine " Albumin/Glucose    Dilation/Effacement/Station    Vaginal Drainage    Edema            Lam Aguirre MD  2/5/2025 9:15 AM

## 2025-02-05 NOTE — ASSESSMENT & PLAN NOTE
- Labs up to date   - Genetic testing: low risk with negative AFP screen   - Level II US scheduled for 2/7/25   - Vaccinations: declines flu vaccine    - RTC for 24 week visit     Exam benign. Discussed possible MSK related pain, will trial sitz baths in mean time, also discussed pelvic floor PT if persistent. Has MFM appt coming up for level II US as well     Orders:    POCT urine dip

## 2025-02-06 NOTE — PROGRESS NOTES
Please refer to the [facility] ultrasound report in Ob Procedures for additional information regarding today's visit

## 2025-02-07 ENCOUNTER — ROUTINE PRENATAL (OUTPATIENT)
Dept: PERINATAL CARE | Facility: OTHER | Age: 35
End: 2025-02-07
Payer: COMMERCIAL

## 2025-02-07 VITALS
HEIGHT: 67 IN | HEART RATE: 73 BPM | BODY MASS INDEX: 23.83 KG/M2 | WEIGHT: 151.8 LBS | SYSTOLIC BLOOD PRESSURE: 102 MMHG | DIASTOLIC BLOOD PRESSURE: 58 MMHG

## 2025-02-07 DIAGNOSIS — Z3A.21 21 WEEKS GESTATION OF PREGNANCY: ICD-10-CM

## 2025-02-07 DIAGNOSIS — O09.522 ELDERLY MULTIGRAVIDA, SECOND TRIMESTER: Primary | ICD-10-CM

## 2025-02-07 DIAGNOSIS — Z36.86 ENCOUNTER FOR ANTENATAL SCREENING FOR CERVICAL LENGTH: ICD-10-CM

## 2025-02-07 PROCEDURE — 99213 OFFICE O/P EST LOW 20 MIN: CPT | Performed by: OBSTETRICS & GYNECOLOGY

## 2025-02-07 PROCEDURE — 76817 TRANSVAGINAL US OBSTETRIC: CPT | Performed by: OBSTETRICS & GYNECOLOGY

## 2025-02-07 PROCEDURE — 76811 OB US DETAILED SNGL FETUS: CPT | Performed by: OBSTETRICS & GYNECOLOGY

## 2025-02-07 NOTE — LETTER
February 7, 2025     Lam Aguirre MD  [address]   [address]  [facility] [place] [address]    Patient: Ashleigh Lambert   YOB: 1990   Date of Visit: 2/7/2025       Dear Dr. Aguirre:    Thank you for referring Ashleigh Lambert to me for evaluation. Below are my notes for this consultation.    If you have questions, please do not hesitate to call me. I look forward to following your patient along with you.         Sincerely,        David Benitez MD        CC: No Recipients    David Benitez MD  2/7/2025  2:59 PM  Sign when Signing Visit  Please refer to the [facility] ultrasound report in Ob Procedures for additional information regarding today's visit

## 2025-02-07 NOTE — PROGRESS NOTES
Ultrasound Probe Disinfection    A transvaginal ultrasound was performed.   Prior to use, disinfection was performed with High Level Disinfection Process ([employer]).  Probe serial number [id number]: [id number] was used.    Lalita Yeung  02/07/25  2:37 PM

## 2025-03-03 ENCOUNTER — ROUTINE PRENATAL (OUTPATIENT)
Dept: OBGYN CLINIC | Facility: CLINIC | Age: 35
End: 2025-03-03
Payer: COMMERCIAL

## 2025-03-03 VITALS — BODY MASS INDEX: 23.96 KG/M2 | SYSTOLIC BLOOD PRESSURE: 100 MMHG | DIASTOLIC BLOOD PRESSURE: 60 MMHG | WEIGHT: 153 LBS

## 2025-03-03 DIAGNOSIS — Z36.89 ENCOUNTER FOR OTHER SPECIFIED ANTENATAL SCREENING: ICD-10-CM

## 2025-03-03 DIAGNOSIS — O09.522 MULTIGRAVIDA OF ADVANCED MATERNAL AGE IN SECOND TRIMESTER: Primary | ICD-10-CM

## 2025-03-03 DIAGNOSIS — Z3A.24 24 WEEKS GESTATION OF PREGNANCY: ICD-10-CM

## 2025-03-03 PROBLEM — O09.529 MATERNAL AGE > 35, MULTIGRAVIDA: Status: ACTIVE | Noted: 2024-12-09

## 2025-03-03 LAB
SL AMB  POCT GLUCOSE, UA: NORMAL
SL AMB POCT URINE PROTEIN: NORMAL

## 2025-03-03 PROCEDURE — 81002 URINALYSIS NONAUTO W/O SCOPE: CPT | Performed by: STUDENT IN AN ORGANIZED HEALTH CARE EDUCATION/TRAINING PROGRAM

## 2025-03-03 PROCEDURE — PNV: Performed by: STUDENT IN AN ORGANIZED HEALTH CARE EDUCATION/TRAINING PROGRAM

## 2025-03-03 NOTE — ASSESSMENT & PLAN NOTE
- PTL/PPROM/Bleeding precautions given.   - MFM consult report from level II ultrasound reviewed. Repeat US is scheduled in 3rd trimester, per [facility] recommendations.  - 28 week labs ordered, lab requisition provided to patient.  - Problem list updated, results console reviewed and updated with pertinent prenatal labs.  - PMH, PSH, medications reviewed and updated as needed.  - Return to office in 4wk(s) for routine prenatal care  Orders:  •  POCT urine dip

## 2025-03-03 NOTE — ASSESSMENT & PLAN NOTE
- Growth US scheduled at 32 weeks with MFM.   - Continue  mg PO daily until 36 weeks for pre-eclampsia risk reduction in setting of AMA and >10 year inter-pregnancy interval.

## 2025-03-03 NOTE — PROGRESS NOTES
Routine Prenatal Visit  [facility] - [place]  [address] [place] [address]  Assessment & Plan  Multigravida of advanced maternal age in second trimester  - Growth US scheduled at 32 weeks with MFM.   - Continue  mg PO daily until 36 weeks for pre-eclampsia risk reduction in setting of AMA and >10 year inter-pregnancy interval.        24 weeks gestation of pregnancy  - PTL/PPROM/Bleeding precautions given.   - MFM consult report from level II ultrasound reviewed. Repeat US is scheduled in 3rd trimester, per MFM recommendations.  - 28 week labs ordered, lab requisition provided to patient.  - Problem list updated, results console reviewed and updated with pertinent prenatal labs.  - PMH, PSH, medications reviewed and updated as needed.  - Return to office in 4wk(s) for routine prenatal care  Orders:  •  POCT urine dip    Encounter for other specified  screening    Orders:  •  CBC; Future  •  Glucose, 1H PG; Future  •  RPR, Rfx Qn RPR/Confirm TP; Future    Subjective:   Ashleigh Lambert is a 34 y.o.  who presents for routine prenatal care at 24w4d.  Complaints today: None  LOF: No; VB: No; Contractions: No; FM: Present    Objective:  /60   Wt 69.4 kg (153 lb)   LMP 2024 (Exact Date)   BMI 23.96 kg/m²     General: Well appearing, no distress  Respiratory: Unlabored breathing  Cardiovascular: Regular rate.  Abdomen: Soft, gravid, nontender  Extremities: Warm and well perfused.  Non tender.    Pregravid Weight/BMI: 61.2 kg (135 lb) (BMI 21.14)  Current Weight: 69.4 kg (153 lb)   Total Weight Gain: 8.165 kg (18 lb)     Pre-Denise Vitals    Flowsheet Row Most Recent Value   Prenatal Assessment    Fetal Heart Rate 155   Fundal Height (cm) 24 cm   Movement Present   Prenatal Vitals    Blood Pressure 100/60   Weight - Scale 69.4 kg (153 lb)   Urine Albumin/Glucose    Dilation/Effacement/Station    Vaginal Drainage    Draining Fluid No   Edema    LLE Edema None   RLE Edema  None   Facial Edema None           Carlos Gonzales MD  3/3/2025 8:23 AM

## 2025-03-27 LAB
EXTERNAL HEMOGLOBIN: 11.8 G/DL
EXTERNAL PLATELET COUNT: 220 K/ÂΜL

## 2025-03-28 LAB — EXTERNAL SYPHILIS TOTAL IGG/IGM SCREENING: NORMAL

## 2025-03-29 LAB
ERYTHROCYTE [DISTWIDTH] IN BLOOD BY AUTOMATED COUNT: 12.1 % (ref 11.7–15.4)
GLUCOSE 1H P 50 G GLC PO SERPL-MCNC: 100 MG/DL (ref 70–139)
HCT VFR BLD AUTO: 34.3 % (ref 34–46.6)
HGB BLD-MCNC: 11.8 G/DL (ref 11.1–15.9)
MCH RBC QN AUTO: 30.3 PG (ref 26.6–33)
MCHC RBC AUTO-ENTMCNC: 34.4 G/DL (ref 31.5–35.7)
MCV RBC AUTO: 88 FL (ref 79–97)
PLATELET # BLD AUTO: 220 X10E3/UL (ref 150–450)
RBC # BLD AUTO: 3.89 X10E6/UL (ref 3.77–5.28)
RPR SER QL: NON REACTIVE
WBC # BLD AUTO: 11.7 X10E3/UL (ref 3.4–10.8)

## 2025-03-31 ENCOUNTER — RESULTS FOLLOW-UP (OUTPATIENT)
Dept: OBGYN CLINIC | Facility: CLINIC | Age: 35
End: 2025-03-31

## 2025-03-31 LAB
CITATION REF LAB TEST: NORMAL
ETHNIC BACKGROUND STATED: NORMAL
GENE DIS ANL CARRIER INTERP-IMP: NORMAL
GENE STUDIED ID: NORMAL
LAB DIRECTOR NAME PROVIDER: NORMAL
LABORATORY COMMENT REPORT: NORMAL
Lab: NORMAL
REASON FOR REFERRAL (NARRATIVE): NORMAL
RECOMMENDATION PATIENT DOC-IMP: NORMAL
REF LAB TEST METHOD: NORMAL
SMN1 GENE MUT ANL BLD/T: NORMAL
SPECIMEN PREPARATION: NORMAL

## 2025-04-01 ENCOUNTER — HOSPITAL ENCOUNTER (OUTPATIENT)
Facility: HOSPITAL | Age: 35
Discharge: HOME | End: 2025-04-01
Attending: OBSTETRICS & GYNECOLOGY | Admitting: OBSTETRICS & GYNECOLOGY
Payer: COMMERCIAL

## 2025-04-01 ENCOUNTER — ROUTINE PRENATAL (OUTPATIENT)
Dept: OBGYN CLINIC | Facility: CLINIC | Age: 35
End: 2025-04-01
Payer: COMMERCIAL

## 2025-04-01 VITALS
DIASTOLIC BLOOD PRESSURE: 62 MMHG | BODY MASS INDEX: 24.33 KG/M2 | HEIGHT: 67 IN | SYSTOLIC BLOOD PRESSURE: 98 MMHG | WEIGHT: 155 LBS

## 2025-04-01 DIAGNOSIS — O09.522 MULTIGRAVIDA OF ADVANCED MATERNAL AGE IN SECOND TRIMESTER: Primary | ICD-10-CM

## 2025-04-01 DIAGNOSIS — Z3A.28 28 WEEKS GESTATION OF PREGNANCY: ICD-10-CM

## 2025-04-01 DIAGNOSIS — O36.8130 DECREASED FETAL MOVEMENTS IN THIRD TRIMESTER, SINGLE OR UNSPECIFIED FETUS: ICD-10-CM

## 2025-04-01 PROBLEM — O36.8190 DECREASED FETAL MOVEMENT IN PREGNANCY: Status: ACTIVE | Noted: 2025-04-01

## 2025-04-01 LAB
SL AMB  POCT GLUCOSE, UA: NORMAL
SL AMB POCT URINE PROTEIN: NORMAL

## 2025-04-01 PROCEDURE — 59025 FETAL NON-STRESS TEST: CPT | Performed by: OBSTETRICS & GYNECOLOGY

## 2025-04-01 PROCEDURE — NC001 PR NO CHARGE: Performed by: OBSTETRICS & GYNECOLOGY

## 2025-04-01 PROCEDURE — 99212 OFFICE O/P EST SF 10 MIN: CPT

## 2025-04-01 PROCEDURE — PNV: Performed by: OBSTETRICS & GYNECOLOGY

## 2025-04-01 PROCEDURE — 76815 OB US LIMITED FETUS(S): CPT | Performed by: OBSTETRICS & GYNECOLOGY

## 2025-04-01 PROCEDURE — 81002 URINALYSIS NONAUTO W/O SCOPE: CPT | Performed by: OBSTETRICS & GYNECOLOGY

## 2025-04-01 NOTE — PROCEDURES
Ashleigh QUIROS Fausto, a  at 28w5d with an RAFFI of 2025, by Last Menstrual Period, was seen at [facility] for the following procedure(s): $Procedure Type: JANEE, NST]    Nonstress Test  Reason for NST: Decreased Fetal Movement  Variability: Moderate  Decelerations: None  Accelerations: Yes  Baseline: 150 BPM  Uterine Irritability: No  Contractions: Not present    4 Quadrant JANEE  JANEE Q1 (cm): 4.2 cm  JANEE Q2 (cm): 3.6 cm  JANEE Q3 (cm): 3.2 cm  JANEE Q4 (cm): 2.4 cm  JANEE TOTAL (cm): 13.4 cm

## 2025-04-01 NOTE — PROGRESS NOTES
"Routine Prenatal Visit  [facility] - [place]  [address] [place] [address]    Assessment/Plan:  Ashleigh is a 34 y.o. year old  at 28w5d who presents for routine prenatal visit.     1. Multigravida of advanced maternal age in second trimester  Assessment & Plan:  Growth @32 weeks  2. 28 weeks gestation of pregnancy  Assessment & Plan:  Normal labs  Orders:  -     POCT urine dip  3. Decreased fetal movements in third trimester, single or unspecified fetus  Assessment & Plan:  Will send to L&D for monitoring        Subjective:   Ashleigh Lambert is a 34 y.o.  who presents for routine prenatal care at 28w5d.  Complaints today: Decreased fetal movement and muscular pain  LOF: -; VB: -; Contractions: -; FM: decreased    Objective:  BP 98/62 (BP Location: Right arm, Patient Position: Sitting, Cuff Size: Standard)   Ht 5' 7\" (1.702 m)   Wt 70.3 kg (155 lb)   LMP 2024 (Exact Date)   BMI 24.28 kg/m²     General: Well appearing, no distress  Respiratory: Unlabored breathing  Abdomen: Soft, gravid, nontender  Extremities: Warm and well perfused.  Non tender.    Pregravid Weight/BMI: 61.2 kg (135 lb) (BMI 21.14)  Current Weight: 70.3 kg (155 lb)   Total Weight Gain: 9.072 kg (20 lb)     Pre- Vitals      Flowsheet Row Most Recent Value   Prenatal Assessment    Fetal Heart Rate 136   Fundal Height (cm) 28 cm   Movement Decreased   Prenatal Vitals    Blood Pressure 98/62   Weight - Scale 70.3 kg (155 lb)   Urine Albumin/Glucose    Dilation/Effacement/Station    Vaginal Drainage    Edema              Shil CRISSY Banerjee DO  2025 8:48 AM     "

## 2025-04-01 NOTE — H&P
OB H&P  Ashleigh Lambert  1990  LD TRIAGE 03/LD TRIAGE 3*          Assessment/Plan:   at 28 weeks.   Decreased fetal movement reported at office visit   Fetal testing reassuring   Pt now feeling lots of movement        34 y.o. yo  at 28 weeks by us and lmp    Chief complaint:  DFM reported at office visit    HPI:  Pt presents for fetal testing.  For NST/JANEE for indication of DFM    Pregnancy Complications:  Patient Active Problem List   Diagnosis    Chronic midline low back pain without sciatica    Maternal age > 35, multigravida    28 weeks gestation of pregnancy    Decreased fetal movement in pregnancy         Past Medical History:  Past Medical History:   Diagnosis Date    Depression     baby blues @ 10 months (after d/c breatfeeding) x 3 months duration - no meds or counseling    Migraine     Ovarian cyst     PID (acute pelvic inflammatory disease)     Urinary tract infection     Varicella     pt had chickenpox in childhood    Yeast infection        Past Surgical History:  Past Surgical History:   Procedure Laterality Date    NO PAST SURGERIES         Social Hx:  Social History     Socioeconomic History    Marital status: /Civil Union     Spouse name: Not on file    Number of children: Not on file    Years of education: Not on file    Highest education level: Not on file   Occupational History    Not on file   Tobacco Use    Smoking status: Never    Smokeless tobacco: Never   Vaping Use    Vaping status: Never Used   Substance and Sexual Activity    Alcohol use: Not Currently     Comment: social    Drug use: No    Sexual activity: Yes     Partners: Male     Birth control/protection: None   Other Topics Concern    Not on file   Social History Narrative    Not on file     Social Drivers of Health     Financial Resource Strain: Not on file   Food Insecurity: No Food Insecurity (2024)    Hunger Vital Sign     Worried About Running Out of Food in the Last Year: Never true     Ran Out of  "Food in the Last Year: Never true   Transportation Needs: No Transportation Needs (2024)    PRAPARE - Transportation     Lack of Transportation (Medical): No     Lack of Transportation (Non-Medical): No   Physical Activity: Not on file   Stress: Not on file   Social Connections: Not on file   Intimate Partner Violence: Not on file   Housing Stability: Low Risk  (2024)    Housing Stability Vital Sign     Unable to Pay for Housing in the Last Year: No     Number of Times Moved in the Last Year: 0     Homeless in the Last Year: No       Meds:  No current facility-administered medications on file prior to encounter.     Current Outpatient Medications on File Prior to Encounter   Medication Sig Dispense Refill    aspirin 81 mg chewable tablet Chew 162 mg daily      CHOLINE PO Take by mouth      loratadine (CLARITIN REDITABS) 10 MG dissolvable tablet Take 10 mg by mouth daily      Prenatal-FeFum-FA-DHA w/o A (PRENATAL + DHA PO) Take 2 tablets by mouth in the morning with Choline         Allergies:  No Known Allergies        RoS/    Constitutional: Negative    CV: Negative    Pulm: Negative    GI: Negative    Urinary: Negative    Neuro: Negative    Musculoskeletal: Negative      Labs:    Recent Results (from the past 24 hours)   POCT urine dip    Collection Time: 25  8:52 AM   Result Value Ref Range    POCT URINE PROTEIN neg     GLUCOSE, UA neg          Obstetric History:    G 2 P 1001  1     Labs:  No results found for: \"STREPGRPB\"  Type & Screen:  ABO Grouping   Date Value Ref Range Status   2024 O  Final      Rh Type   Date Value Ref Range Status   2024 Positive  Final     Comment:     Please note: Prior records for this patient's ABO / Rh type are not  available for additional verification.      No results found for: \"ANTIBODYSCR\"  No results found for: \"SPECIMEXP\"  HIV-1/HIV-2 AB   Date Value Ref Range Status   2024 Non-Reactive  Final     Hepatitis B Surface Ag   Date Value Ref " "Range Status   12/07/2024 Negative  Final     RPR   Date Value Ref Range Status   03/28/2025 Non Reactive Non Reactive Final     No results found for: \"RUBELLAIGGQT\"  Glucose   Date Value Ref Range Status   03/28/2025 100 70 - 139 mg/dL Final     Comment:     According to ADA, a glucose threshold of >139 mg/dL after 50-gram  load identifies approximately 80% of women with gestational  diabetes mellitus, while the sensitivity is further increased to  approximately 90% by a threshold of >129 mg/dL.             Physical Exam:  Vital signs:  There were no vitals filed for this visit.       Constitutional:       General: She is not in acute distress.     Appearance: Normal appearance.   HENT:      Head: Normocephalic.      Nose: Nose normal.   Eyes:      General: No scleral icterus.  Cardiovascular:      Pulses: Normal pulses.   Pulmonary:      Effort: Pulmonary effort is normal. No respiratory distress.      Breath sounds: No wheezing.   Abdominal:      General: Bowel sounds are normal. There is no distension.      Palpations: Abdomen is soft.Uterine size appropriate for gestational age     Tenderness: There is no abdominal tenderness. There is no guarding.   Musculoskeletal:      Cervical back: Neck supple.      Right lower leg: No edema.      Left lower leg: No edema.   Skin:     General: Skin is warm.      Capillary Refill: Capillary refill takes less than 2 seconds.   Neurological:      Mental Status: She is alert and oriented to person, place, and time.   Psychiatric:         Mood and Affect: Mood normal.        FHR: cat 1          "

## 2025-04-05 ENCOUNTER — CLINICAL SUPPORT (OUTPATIENT)
Age: 35
End: 2025-04-05

## 2025-04-05 DIAGNOSIS — Z32.2 ENCOUNTER FOR CHILDBIRTH INSTRUCTION: Primary | ICD-10-CM

## 2025-04-14 ENCOUNTER — ROUTINE PRENATAL (OUTPATIENT)
Dept: OBGYN CLINIC | Facility: CLINIC | Age: 35
End: 2025-04-14
Payer: COMMERCIAL

## 2025-04-14 VITALS
SYSTOLIC BLOOD PRESSURE: 92 MMHG | WEIGHT: 157 LBS | BODY MASS INDEX: 24.64 KG/M2 | HEIGHT: 67 IN | DIASTOLIC BLOOD PRESSURE: 62 MMHG

## 2025-04-14 DIAGNOSIS — Z23 NEED FOR TDAP VACCINATION: ICD-10-CM

## 2025-04-14 DIAGNOSIS — O09.523 MULTIGRAVIDA OF ADVANCED MATERNAL AGE IN THIRD TRIMESTER: Primary | ICD-10-CM

## 2025-04-14 DIAGNOSIS — Z23 ENCOUNTER FOR IMMUNIZATION: ICD-10-CM

## 2025-04-14 DIAGNOSIS — Z3A.30 30 WEEKS GESTATION OF PREGNANCY: ICD-10-CM

## 2025-04-14 LAB
SL AMB  POCT GLUCOSE, UA: NORMAL
SL AMB POCT URINE PROTEIN: NORMAL

## 2025-04-14 PROCEDURE — PNV: Performed by: OBSTETRICS & GYNECOLOGY

## 2025-04-14 PROCEDURE — 90715 TDAP VACCINE 7 YRS/> IM: CPT | Performed by: OBSTETRICS & GYNECOLOGY

## 2025-04-14 PROCEDURE — 81002 URINALYSIS NONAUTO W/O SCOPE: CPT | Performed by: OBSTETRICS & GYNECOLOGY

## 2025-04-14 PROCEDURE — 90471 IMMUNIZATION ADMIN: CPT | Performed by: OBSTETRICS & GYNECOLOGY

## 2025-04-14 RX ORDER — MAGNESIUM L-LACTATE 84 MG
84 TABLET, EXTENDED RELEASE ORAL DAILY
COMMUNITY

## 2025-04-14 NOTE — PROGRESS NOTES
"Routine Prenatal Visit  [facility] - [place]  [address] [place] [address]    Assessment/Plan:  Ashleigh is a 34 y.o. year old  at 30w4d who presents for routine prenatal visit.     1. Multigravida of advanced maternal age in third trimester  2. 30 weeks gestation of pregnancy  -     POCT urine dip  3. Encounter for immunization  4. Need for Tdap vaccination  -     Tdap Vaccine greater than or equal to 6yo        Subjective:     CC: Prenatal care    Ashleigh Lambert is a 34 y.o.  female who presents for routine prenatal care at 30w4d.  Pregnancy ROS: no leakage of fluid, pelvic pain, or vaginal bleeding.  normal fetal movement.    The following portions of the patient's history were reviewed and updated as appropriate: allergies, current medications, past family history, past medical history, obstetric history, gynecologic history, past social history, past surgical history and problem list.      Objective:  BP 92/62 (BP Location: Left arm, Patient Position: Sitting, Cuff Size: Standard)   Ht 5' 7\" (1.702 m)   Wt 71.2 kg (157 lb)   LMP 2024 (Exact Date)   BMI 24.59 kg/m²   Pregravid Weight/BMI: 61.2 kg (135 lb) (BMI 21.14)  Current Weight: 71.2 kg (157 lb)   Total Weight Gain: 9.979 kg (22 lb)   Pre- Vitals      Flowsheet Row Most Recent Value   Prenatal Assessment    Fetal Heart Rate 140   Fundal Height (cm) 30 cm   Movement Present   Presentation Vertex   Prenatal Vitals    Blood Pressure 92/62   Weight - Scale 71.2 kg (157 lb)   Urine Albumin/Glucose    Dilation/Effacement/Station    Vaginal Drainage    Edema              General: Well appearing, no distress  Respiratory: Unlabored breathing  Cardiovascular: Regular rate.  Abdomen: Soft, gravid, nontender  Fundal Height: Appropriate for gestational age.  Extremities: Warm and well perfused.  Non tender.  "

## 2025-04-16 ENCOUNTER — TELEPHONE (OUTPATIENT)
Dept: OBGYN CLINIC | Facility: CLINIC | Age: 35
End: 2025-04-16

## 2025-04-16 NOTE — TELEPHONE ENCOUNTER
Third Trimester call -    Overall how are you feeling?   Reports she is feeling and baby is active.     Compliant with routine OB appointments? Yes, last seen on 4/14/25 and scheduled through 38.4 weeks     Have you completed your 3rd trimester lab work? yes    Have you reviewed the contents of 3rd trimester folder from office?    Have you decided on a pediatrician? Undecided at this time.    If yes, who     If no, reviewed practices and transferred call to [phone] to set up appointment with pediatric office.   Questions on paperwork to go back to office? no   Questions on the baby birth certificate and photography forms? no  Sent link for the Hospital Readiness Video via [employer]

## 2025-04-22 ENCOUNTER — NURSE TRIAGE (OUTPATIENT)
Dept: OTHER | Facility: OTHER | Age: 35
End: 2025-04-22

## 2025-04-23 NOTE — TELEPHONE ENCOUNTER
"Regardin weeks pregnant/spotting  ----- Message from Annemarie BISHOP sent at 2025  7:52 PM EDT -----  Patient stated, \"I'm 32 weeks and have started spotting for the first time. I'm not sure what to do or if I should be concerned?\"    "

## 2025-04-23 NOTE — TELEPHONE ENCOUNTER
"FOLLOW UP: No follow up needed    REASON FOR CONVERSATION: Pregnancy Problem    SYMPTOMS: vaginal spotting when wiping    OTHER: N/A    DISPOSITION:  Now (overriding Go to LD Now (or PCP Triage))  On call provider stated to monitor bleeding and if bleeding increases to call back. Patient notified and verbalized understanding.     Reason for Disposition   [1] Pregnant 24 to 36 weeks () AND [2] pinkish or brownish mucous discharge  (Exception: Single episode of faint spotting when wiping, or slight spotting after intercourse or pelvic exam.)    Answer Assessment - Initial Assessment Questions  1. ONSET: \"When did this bleeding start?\"         30 minutes ago     2. BLEEDING SEVERITY: \"Describe the bleeding that you are having.\" \"How much bleeding is there?\"       Light pink spotting with mucous when wiping    3. ABDOMEN PAIN: \"Do you have any pain?\" \"How bad is the pain?\"  (e.g., Scale 0-10; none, mild, moderate, or severe)      Denies    4. PREGNANCY: \"Do you know how many weeks or months pregnant you are?\"       31 weeks 5 days    5. RAFFI: \"What date are you expecting to deliver?\"      25    6. FETAL MOVEMENT: \"Has the baby's movement decreased or changed significantly from normal?\"      Denies    7. HIGH-RISK PREGNANCY:  \"Have been told by your doctor that you have a high-risk condition that can cause bleeding?\"  (e.g., placenta previa, vasa previa)       Denies    8. ULTRASOUND: \"Have you had an ultrasound during this pregnancy?\"  Note: To confirm intrauterine pregnancy, placenta location.      Yes    9. HEMODYNAMIC STATUS: \"Are you weak or feeling lightheaded?\" If Yes, ask: \"Can you stand and walk normally?\"       Denies    10. OTHER SYMPTOMS: \"What other symptoms are you having with the bleeding?\" (e.g., leaking fluid from vagina, contractions)        denies    Protocols used: Pregnancy - Vaginal Bleeding Greater Than 20 Weeks EGA-Adult-AH    "

## 2025-04-25 ENCOUNTER — ULTRASOUND (OUTPATIENT)
Dept: PERINATAL CARE | Facility: OTHER | Age: 35
End: 2025-04-25
Attending: OBSTETRICS & GYNECOLOGY
Payer: COMMERCIAL

## 2025-04-25 VITALS
HEART RATE: 68 BPM | HEIGHT: 67 IN | SYSTOLIC BLOOD PRESSURE: 92 MMHG | DIASTOLIC BLOOD PRESSURE: 54 MMHG | WEIGHT: 157.2 LBS | BODY MASS INDEX: 24.67 KG/M2

## 2025-04-25 DIAGNOSIS — O09.523 MULTIGRAVIDA OF ADVANCED MATERNAL AGE IN THIRD TRIMESTER: ICD-10-CM

## 2025-04-25 DIAGNOSIS — Z3A.32 32 WEEKS GESTATION OF PREGNANCY: Primary | ICD-10-CM

## 2025-04-25 PROCEDURE — 76816 OB US FOLLOW-UP PER FETUS: CPT | Performed by: OBSTETRICS & GYNECOLOGY

## 2025-04-25 PROCEDURE — 99213 OFFICE O/P EST LOW 20 MIN: CPT | Performed by: OBSTETRICS & GYNECOLOGY

## 2025-04-25 NOTE — PROGRESS NOTES
The patient was seen today for an ultrasound.  Please see ultrasound report (located under Ob Procedures) for additional details.   Thank you very much for allowing us to participate in the care of this very nice patient.  Should you have any questions, please do not hesitate to contact me.     Dante Espana MD FACOG  Attending Physician, Maternal-Fetal Medicine  [facility]

## 2025-04-28 ENCOUNTER — ROUTINE PRENATAL (OUTPATIENT)
Dept: OBGYN CLINIC | Facility: CLINIC | Age: 35
End: 2025-04-28
Payer: COMMERCIAL

## 2025-04-28 VITALS
HEIGHT: 67 IN | WEIGHT: 158.6 LBS | SYSTOLIC BLOOD PRESSURE: 90 MMHG | BODY MASS INDEX: 24.89 KG/M2 | DIASTOLIC BLOOD PRESSURE: 56 MMHG

## 2025-04-28 DIAGNOSIS — O09.523 MULTIGRAVIDA OF ADVANCED MATERNAL AGE IN THIRD TRIMESTER: ICD-10-CM

## 2025-04-28 DIAGNOSIS — O26.899 PELVIC PAIN IN PREGNANCY: Primary | ICD-10-CM

## 2025-04-28 DIAGNOSIS — Z3A.32 32 WEEKS GESTATION OF PREGNANCY: ICD-10-CM

## 2025-04-28 DIAGNOSIS — R10.2 PELVIC PAIN IN PREGNANCY: Primary | ICD-10-CM

## 2025-04-28 LAB
SL AMB  POCT GLUCOSE, UA: NORMAL
SL AMB POCT URINE PROTEIN: NORMAL

## 2025-04-28 PROCEDURE — PNV: Performed by: STUDENT IN AN ORGANIZED HEALTH CARE EDUCATION/TRAINING PROGRAM

## 2025-04-28 PROCEDURE — 81002 URINALYSIS NONAUTO W/O SCOPE: CPT | Performed by: STUDENT IN AN ORGANIZED HEALTH CARE EDUCATION/TRAINING PROGRAM

## 2025-04-28 NOTE — ASSESSMENT & PLAN NOTE
- PTL/PPROM/Bleeding precautions given. Kick counts reviewed  - Third trimester labs reviewed.  - Problem list updated, results console reviewed and updated with pertinent prenatal labs.  - PMH, PSH, medications reviewed and updated as needed  - Return to office in 2 wk(s) for routine prenatal care  Orders:  •  POCT urine dip

## 2025-04-28 NOTE — ASSESSMENT & PLAN NOTE
- Continue  mg PO daily until 36 weeks for pre-eclampsia risk reduction.   - Growth US at 32 weeks with MFM showed normal growth.

## 2025-04-28 NOTE — PROGRESS NOTES
"Routine Prenatal Visit  [facility] - [place]  [address] [place] [address]  Assessment & Plan  Pelvic pain in pregnancy  - Discussed pregnancy support band, option of PT referral. Desires PT. Referral provided.   Orders:  •  Ambulatory Referral to Physical Therapy; Future    Multigravida of advanced maternal age in third trimester  - Continue  mg PO daily until 36 weeks for pre-eclampsia risk reduction.   - Growth US at 32 weeks with MFM showed normal growth.        32 weeks gestation of pregnancy  - PTL/PPROM/Bleeding precautions given. Kick counts reviewed  - Third trimester labs reviewed.  - Problem list updated, results console reviewed and updated with pertinent prenatal labs.  - PMH, PSH, medications reviewed and updated as needed  - Return to office in 2 wk(s) for routine prenatal care  Orders:  •  POCT urine dip    Subjective:   Ashleigh Lambert is a 35 y.o.  who presents for routine prenatal care at 32w4d.  Complaints today: Pelvic girdle pain with movement. Spotting after intercourse this week.   LOF: No; VB: No; Contractions: No; FM: Present and normal    Objective:  BP 90/56 (BP Location: Left arm, Patient Position: Sitting, Cuff Size: Standard)   Ht 5' 7\" (1.702 m)   Wt 71.9 kg (158 lb 9.6 oz)   LMP 2024 (Exact Date)   BMI 24.84 kg/m²     General: Well appearing, no distress  Respiratory: Unlabored breathing  Cardiovascular: Regular rate.  Abdomen: Soft, gravid, nontender  Extremities: Warm and well perfused.  Non tender.    Speculum exam: Normal discharge with very slight rust-colored tint. No bleeding noting. Cervix closed, long, high.     Pregravid Weight/BMI: 61.2 kg (135 lb) (BMI 21.14)  Current Weight: 71.9 kg (158 lb 9.6 oz)   Total Weight Gain: 10.7 kg (23 lb 9.6 oz)     Pre-Denise Vitals    Flowsheet Row Most Recent Value   Prenatal Assessment    Fetal Heart Rate 150   Fundal Height (cm) 32 cm   Movement Present   Prenatal Vitals    Blood Pressure 90/56 "   Weight - Scale 71.9 kg (158 lb 9.6 oz)   Urine Albumin/Glucose    Dilation/Effacement/Station    Cervical Dilation 0   Cervical Effacement 0   Fetal Station -4   Vaginal Drainage    Draining Fluid No   Edema    LLE Edema None   RLE Edema None   Facial Edema None           Carlos Gonzales MD  4/28/2025 8:46 AM

## 2025-05-06 ENCOUNTER — EVALUATION (OUTPATIENT)
Dept: PHYSICAL THERAPY | Facility: CLINIC | Age: 35
End: 2025-05-06
Attending: STUDENT IN AN ORGANIZED HEALTH CARE EDUCATION/TRAINING PROGRAM
Payer: COMMERCIAL

## 2025-05-06 DIAGNOSIS — R10.2 PELVIC PAIN IN PREGNANCY: Primary | ICD-10-CM

## 2025-05-06 DIAGNOSIS — O26.899 PELVIC PAIN IN PREGNANCY: Primary | ICD-10-CM

## 2025-05-06 PROCEDURE — 97162 PT EVAL MOD COMPLEX 30 MIN: CPT | Performed by: PHYSICAL THERAPIST

## 2025-05-06 PROCEDURE — 97530 THERAPEUTIC ACTIVITIES: CPT | Performed by: PHYSICAL THERAPIST

## 2025-05-06 NOTE — PROGRESS NOTES
PT Evaluation     Today's date: 2025  Patient name: Ashleigh Lambert  : 1990  MRN: [id number]  Referring provider: Carlos Gonzales MD  Dx:   Encounter Diagnosis     ICD-10-CM    1. Pelvic pain in pregnancy  O26.899     R10.2                      Assessment  Impairments: abnormal gait, abnormal or restricted ROM, activity intolerance, impaired physical strength, lacks appropriate home exercise program, pain with function, poor posture , poor body mechanics, unable to perform ADL, activity limitations and endurance  Understanding of Dx/Px/POC: good     Prognosis: good    Goals  (Up to 3 weeks)  1. Independent and safe with HEP.  2. Independent and safe with body mechanics using SI and pregnancy support belt.  3. Independent and safe with bed mobility and log rolling transfer.  4. Improve safe gait with use of SI belt on all surfaces with more comfort.    Plan  Patient would benefit from: skilled physical therapy    Planned therapy interventions: manual therapy, neuromuscular re-education, patient/caregiver education, postural training, strengthening, stretching, therapeutic activities, therapeutic exercise, therapeutic training, transfer training, home exercise program, graded exercise, graded activity, flexibility, gait training, breathing training, body mechanics training, behavior modification, activity modification and abdominal trunk stabilization    Frequency: 1x week  Duration in weeks: 3  Treatment plan discussed with: patient        PT Pelvic Floor Subjective:   History of Present Illness:   Pt is 36 y/o pregnant female (34 weeks)  c/o pelvic pain for about 2 weeks. Pt  was sen by OBGYN and was referred to [facility] PT for evaluation and tx.  Current functional limitations: pain with standing/amb, stairs negotiation, bed mobility - turning, pain with transfers.Date of onset: 2025          Recurrent probem    Quality of life: fair    Social Support:     Lives in:  Multiple-level home    Lives with:  " Spouse    Relationship status: /committed    Work status: employed full time (prolong sitting)    Life stress level: 8    Life stress severity: severe    History of Depression: noPronouns: she/her  Hand dominance:  Right  Diet and Exercise:    Diet:balanced nutrition    Exercise type: combination activity    stretching    Exercise frequency: daily  OB/ gyn History    Gestational History:     Prior Pregnancy: Yes      Number of prior pregnancies: 2    Number of term pregnancies: 1 (currently pregnant with her second child)    Delivery Type: vaginal delivery      Number of vaginal deliveries: 1 (2010: 7.6 lbs)  no delivery complications  Bladder Function:     Voiding Difficulties positive for: urgency and frequent urination      Voiding Difficulties negative for: incomplete emptying       Voiding Difficulties comments:     Voiding frequency: every 31-60 minutes    Urinary leakage: no urine leakage    Urinary leakage aggravated by: post-void dribble    Nocturia (episodes per night): 1    Fluid Intake Type:  Water and coffee    Intake (ounces): Water intake (oz): 42 oz x 5. Coffee intake (oz): 1 cup.   Incontinence Management:     Pads/Diaper Use:  None  Bowel Function:     Voiding DIfficulties: unfinished feeling after defecating and constipation      [place] Stool Scale: type 2, type 3 and type 4    Stool softener use: no stool softeners    Enema use: no enema    Uses \"squatty potty\": no Squatty Potty  Sexual Function:     Sexually Active:  Sexually active    Pain during intercourse: Yes      pain causes abstinence    Patient wishes to return to having intercourse: currently unable to have intercourse but wants toSexual function: pelvis/suprapubic pain  Pain:     Current pain ratin    At best pain ratin    At worst pain ratin    Onset:  Less than 1 month ago    Quality:  Sharp, dull ache and pressure    Aggravating factors:  Prolonged positions, sit to stand transition, intercourse and walking   "  Duration of symptoms:  Does not go away    Relieving factors:  Change in position, relaxation and support    Progression:  Worsening  Diagnostic Tests:     None    Treatments:     None    Patient Goals:     Patient goals for therapy:  Improved comfort, improved quality of life and improved pain management    Other patient goals:  More comfort, prep for postpartum recovery      Objective     Static Posture   General Observations  Asymmetrical weight bearing.     Head  Forward.    Shoulders  Rounded.    Thoracic Spine  Flattened thoracic spine.    Lumbar Spine   Flattened and decreased lordosis.     Postural Observations  Seated posture: poor  Standing posture: poor    Additional Postural Observation Details  L-roll use recommended.    SI abd preg support belt use recommended.    Tenderness     Additional Tenderness Details  C/o generalized centralized LB tenderness and b/ groin/AD's tenderness.    Active Range of Motion   Cervical/Thoracic Spine       Thoracic    Flexion:  WFL  Extension:  Restriction level: moderate  Left lateral flexion:  WFL  Right lateral flexion:  WFL  Left rotation:  WFL  Right rotation:  WFL    Lumbar   Flexion:  WFL  Extension:  Restriction level: moderate  Left lateral flexion:  WFL  Right lateral flexion:  WFL  Left rotation:  WFL  Right rotation:  WFL    Strength/Myotome Testing     Left Hip   Planes of Motion   Flexion: 4+  Abduction: 4+  Adduction: 4+  External rotation: 4+  Internal rotation: 4+    Right Hip   Planes of Motion   Flexion: 4+  Abduction: 4+  Adduction: 4+  External rotation: 4+  Internal rotation: 4+    Left Knee   Flexion: 5  Extension: 5    Right Knee   Flexion: 5  Extension: 5    Left Ankle/Foot   Dorsiflexion: 5  Plantar flexion: 5  Eversion: 5    Right Ankle/Foot   Dorsiflexion: 5  Plantar flexion: 5  Eversion: 5    Ambulation     Ambulation: Level Surfaces   Ambulation without assistive device: independent    Ambulation: Stairs   Ascend stairs:  "independent  Pattern: creeps up  Pattern: creeps down  Curbs: independent    Observational Gait   Gait: antalgic and asymmetric   Decreased walking speed and stride length.   Left foot contact pattern: heel to toe  Right foot contact pattern: heel to toe  Base of support: increased    Functional Assessment        Comments  Pain with bed mobility and transfers. Educated on use of SI belt and log rolling transfer tech.      Abdominal Assessment:      Position: supine exam  Abdominal Assessment:  Abdominal findings with curl up: with knees flexed.    Diastatis   Diastasis recti present: yes  3\" above umbilicus (# fingers): 4  Umbilicus (# fingers): 4  3\" below umbilicus (# fingers): 3  Connective tissue integrity at linea alba: firm  no tenderness at linea alba  unable to engage transverse abdominis                  Precautions: (+) pregnancy      Manuals 5/6            LB TPR             B/l AD's stretch                                       Neuro Re-Ed                          Rec bike/L-roll/posture             UBE/postureb'kwrd/L-roll                                                                 Ther Ex                                                                                                                     Ther Activity             HEP eud/review  5'           Log rolling transfers  5'           SI belt/preg. belt use edu  5'           L-roll use/postural edu  5'           Gait Training                                       Modalities                                            "

## 2025-05-06 NOTE — HOME EXERCISE EDUCATION
Program_ID:[id number]   Access Code: [email or web address]  URL: [email or web address]  Date: 05-  Prepared By: Gerry Guillaume    Program Notes      Exercises      - Seated Correct Posture - 1 x daily - 7 x weekly - 1 sets - 1 reps      - Sitting to Supine Roll - 1 x daily - 7 x weekly - 1 sets - 1 reps

## 2025-05-14 ENCOUNTER — OFFICE VISIT (OUTPATIENT)
Dept: PHYSICAL THERAPY | Facility: CLINIC | Age: 35
End: 2025-05-14
Attending: STUDENT IN AN ORGANIZED HEALTH CARE EDUCATION/TRAINING PROGRAM
Payer: COMMERCIAL

## 2025-05-14 DIAGNOSIS — O26.899 PELVIC PAIN IN PREGNANCY: Primary | ICD-10-CM

## 2025-05-14 DIAGNOSIS — R10.2 PELVIC PAIN IN PREGNANCY: Primary | ICD-10-CM

## 2025-05-14 PROCEDURE — 97110 THERAPEUTIC EXERCISES: CPT | Performed by: PHYSICAL THERAPIST

## 2025-05-14 PROCEDURE — 97112 NEUROMUSCULAR REEDUCATION: CPT | Performed by: PHYSICAL THERAPIST

## 2025-05-14 NOTE — PROGRESS NOTES
"Daily Note     Today's date: 2025  Patient name: Ashleigh Lambert  : 1990  MRN: [id number]  Referring provider: Carlos Gonzales MD  Dx:   Encounter Diagnosis     ICD-10-CM    1. Pelvic pain in pregnancy  O26.899     R10.2                      Subjective: (-) SI belt, (-) pregnancy support belt presented at PT tx. Pt c/o same pelvic pain with various standing/sitting/amb ADL's at home. C/o R kami discomfort at this time.      Objective: See treatment diary below      Assessment: Tolerated treatment well. Patient exhibited good technique with therapeutic exercises and would benefit from continued PT for safe and modified ex tech in pain free range. HEP was given and reviewed. Pt was educated on importance of pregnancy support belt and SI belt on uneven surfaces amb/stairs negotiation.      Plan: Continue per plan of care.  Progress treatment as tolerated.       Precautions: (+) pregnancy      Manuals            LB TPR  np           B/l AD's stretch  np                                     Neuro Re-Ed                          Rec bike/L-roll/posture             UBE/postureb'kwrd/L-roll  L1 8'                                                               Ther Ex             Supine PF/TA w AD's t-ball  5x3\" VC's           Supine semi bridge w t-ball PF/TA  5x3\" VC's           Sitting PF/TA/AD's t-ball use  10x3\" VC's           Sit to stand w t-ball PF/TA  5x3\" VC's           Cat/cow PF  2x VC's           Child pose stretch  1x30\"                                                               Ther Activity             HEP eud/review 5'            Log rolling transfers 5'            SI belt/preg. belt use edu 5' 8' edu again on importance of daily use           L-roll use/postural edu 5'            Gait Training                                       Modalities                                            "

## 2025-05-14 NOTE — HOME EXERCISE EDUCATION
Program_ID:[id number]   Access Code: [email or web address]  URL: [email or web address]  Date: 05-  Prepared By: Gerry Guillaume    Program Notes      Exercises      - Seated Correct Posture - 1 x daily - 7 x weekly - 1 sets - 1 reps      - Sitting to Supine Roll - 1 x daily - 7 x weekly - 1 sets - 1 reps      - Pelvic Floor Contractions in Hooklying with Adduction - 2 x daily - 7 x weekly - 1 sets - 10 reps - 3-5 sec hold      - Supine Bridge with Mini Swiss Ball Between Knees - 2 x daily - 7 x weekly - 1 sets - 10 reps - 3-5 sec hold      - Seated Pelvic Floor Contraction with Isometric Hip Adduction - 2 x daily - 7 x weekly - 1 sets - 10 reps - 3-5 sec hold      - Sit to Stand with Pelvic Floor Contraction - 2 x daily - 7 x weekly - 1 sets - 10 reps - 3-5 sec hold      - Quadruped Cat Cow - 2 x daily - 7 x weekly - 1 sets - 10 reps - 3-5 sec hold      - Diaphragmatic Breathing in Child&#39;s Pose with Pelvic Floor Relaxation - 2 x daily - 7 x weekly - 1 sets - 3-5 reps - 30 sec hold

## 2025-05-15 ENCOUNTER — ROUTINE PRENATAL (OUTPATIENT)
Dept: OBGYN CLINIC | Facility: CLINIC | Age: 35
End: 2025-05-15
Payer: COMMERCIAL

## 2025-05-15 VITALS
DIASTOLIC BLOOD PRESSURE: 70 MMHG | BODY MASS INDEX: 25.36 KG/M2 | SYSTOLIC BLOOD PRESSURE: 132 MMHG | WEIGHT: 161.6 LBS | HEIGHT: 67 IN

## 2025-05-15 DIAGNOSIS — O09.523 MULTIGRAVIDA OF ADVANCED MATERNAL AGE IN THIRD TRIMESTER: Primary | ICD-10-CM

## 2025-05-15 DIAGNOSIS — G89.29 CHRONIC MIDLINE LOW BACK PAIN WITHOUT SCIATICA: ICD-10-CM

## 2025-05-15 DIAGNOSIS — Z3A.35 35 WEEKS GESTATION OF PREGNANCY: ICD-10-CM

## 2025-05-15 DIAGNOSIS — M54.50 CHRONIC MIDLINE LOW BACK PAIN WITHOUT SCIATICA: ICD-10-CM

## 2025-05-15 LAB
SL AMB  POCT GLUCOSE, UA: NEGATIVE
SL AMB POCT URINE PROTEIN: NEGATIVE

## 2025-05-15 PROCEDURE — 81002 URINALYSIS NONAUTO W/O SCOPE: CPT | Performed by: OBSTETRICS & GYNECOLOGY

## 2025-05-15 PROCEDURE — PNV: Performed by: OBSTETRICS & GYNECOLOGY

## 2025-05-15 NOTE — PROGRESS NOTES
"Routine Prenatal Visit  [facility] - [place]  [address] [place] [address]    Assessment/Plan:  Ashleigh is a 35 y.o. year old  at 35w0d who presents for routine prenatal visit.     1. Multigravida of advanced maternal age in third trimester  Assessment & Plan:  Stop baby ASA next week  2. 35 weeks gestation of pregnancy  -     POCT urine dip  3. Chronic midline low back pain without sciatica  Assessment & Plan:  Cont with PT        Subjective:   Ashleigh Lambert is a 35 y.o.  who presents for routine prenatal care at 35w0d.  Complaints today: Denies  LOF: -; VB: -; Contractions: -; FM: +    Objective:  /70 (BP Location: Left arm, Patient Position: Sitting, Cuff Size: Standard)   Ht 5' 7\" (1.702 m)   Wt 73.3 kg (161 lb 9.6 oz)   LMP 2024 (Exact Date)   BMI 25.31 kg/m²     General: Well appearing, no distress  Respiratory: Unlabored breathing  Abdomen: Soft, gravid, nontender  Extremities: Warm and well perfused.  Non tender.    Pregravid Weight/BMI: 61.2 kg (135 lb) (BMI 21.14)  Current Weight: 73.3 kg (161 lb 9.6 oz)   Total Weight Gain: 12.1 kg (26 lb 9.6 oz)     Pre-Denise Vitals      Flowsheet Row Most Recent Value   Prenatal Assessment    Fetal Heart Rate 142   Fundal Height (cm) 35 cm   Movement Present   Prenatal Vitals    Blood Pressure 132/70   Weight - Scale 73.3 kg (161 lb 9.6 oz)   Urine Albumin/Glucose    Dilation/Effacement/Station    Vaginal Drainage    Edema              Shil CRISSY Banerjee DO  5/15/2025 9:13 AM     "

## 2025-05-22 ENCOUNTER — ROUTINE PRENATAL (OUTPATIENT)
Dept: OBGYN CLINIC | Facility: CLINIC | Age: 35
End: 2025-05-22

## 2025-05-22 VITALS
BODY MASS INDEX: 25.21 KG/M2 | HEIGHT: 67 IN | WEIGHT: 160.6 LBS | DIASTOLIC BLOOD PRESSURE: 70 MMHG | SYSTOLIC BLOOD PRESSURE: 120 MMHG

## 2025-05-22 DIAGNOSIS — Z3A.36 36 WEEKS GESTATION OF PREGNANCY: ICD-10-CM

## 2025-05-22 DIAGNOSIS — Z36.85 ANTENATAL SCREENING FOR STREPTOCOCCUS B: ICD-10-CM

## 2025-05-22 DIAGNOSIS — O09.523 MULTIGRAVIDA OF ADVANCED MATERNAL AGE IN THIRD TRIMESTER: Primary | ICD-10-CM

## 2025-05-22 LAB
SL AMB  POCT GLUCOSE, UA: NORMAL
SL AMB POCT URINE PROTEIN: NORMAL

## 2025-05-22 NOTE — PROGRESS NOTES
"Routine Prenatal Visit  [facility] - [address], [place] [address]    Assessment/Plan:  Ashleigh is a 35 y.o. year old  at 36w0d who presents for routine prenatal visit.     1. Multigravida of advanced maternal age in third trimester  2.  screening for streptococcus B  -     Strep B DNA probe, amplification  3. 36 weeks gestation of pregnancy  Assessment & Plan:  GBS done today.   Delivery consent reviewed and signed. Risks of delivery reviewed, including bleeding, infection, damage to surrounding organs/structures (specifically bowel, bladder, vessels, nerves, ureters), need for operative vaginal delivery or  delivery, hysterectomy, need for blood transfusion, need for further surgery.   Reviewed s/s of labor, how and when to call.   Continue routine prenatal care.   Return to office for ob check in 1 week.     Orders:  -     POCT urine dip      Next OB Visit 1 week.     Subjective:     CC: Prenatal care    Ashleigh Lambert is a 35 y.o.  female who presents for routine prenatal care at 36w0d.  Pregnancy ROS: Good FM, No N/V, HA, cramping, VB, LOF, edema, domestic violence, or smoking. Tolerating PNV.    The following portions of the patient's history were reviewed and updated as appropriate: allergies, current medications, past family history, past medical history, obstetric history, gynecologic history, past social history, past surgical history and problem list.      Objective:  /70 (BP Location: Left arm, Patient Position: Sitting, Cuff Size: Standard)   Ht 5' 7\" (1.702 m)   Wt 72.8 kg (160 lb 9.6 oz)   LMP 2024 (Exact Date)   BMI 25.15 kg/m²   Pregravid Weight/BMI: 61.2 kg (135 lb) (BMI 21.14)  Current Weight: 72.8 kg (160 lb 9.6 oz)   Total Weight Gain: 11.6 kg (25 lb 9.6 oz)   Pre- Vitals      Flowsheet Row Most Recent Value   Prenatal Assessment    Fetal Heart Rate 145   Fundal Height (cm) 36 cm   Movement Present   Prenatal " Vitals    Blood Pressure 120/70   Weight - Scale 72.8 kg (160 lb 9.6 oz)   Urine Albumin/Glucose    Dilation/Effacement/Station    Vaginal Drainage    Edema    LLE Edema None   RLE Edema None   Facial Edema None             General: Well appearing, no distress  Abdomen: Soft, gravid, nontender  Fundal Height: Appropriate for gestational age.  Extremities: Warm and well perfused.  Non tender.

## 2025-05-23 ENCOUNTER — APPOINTMENT (OUTPATIENT)
Dept: PHYSICAL THERAPY | Facility: CLINIC | Age: 35
End: 2025-05-23
Attending: STUDENT IN AN ORGANIZED HEALTH CARE EDUCATION/TRAINING PROGRAM
Payer: COMMERCIAL

## 2025-05-23 PROBLEM — Z3A.36 36 WEEKS GESTATION OF PREGNANCY: Status: ACTIVE | Noted: 2024-12-10

## 2025-05-23 NOTE — ASSESSMENT & PLAN NOTE
GBS done today.   Delivery consent reviewed and signed. Risks of delivery reviewed, including bleeding, infection, damage to surrounding organs/structures (specifically bowel, bladder, vessels, nerves, ureters), need for operative vaginal delivery or  delivery, hysterectomy, need for blood transfusion, need for further surgery.   Reviewed s/s of labor, how and when to call.   Continue routine prenatal care.   Return to office for ob check in 1 week.

## 2025-05-24 LAB — GP B STREP DNA SPEC QL NAA+PROBE: NEGATIVE

## 2025-05-25 ENCOUNTER — RESULTS FOLLOW-UP (OUTPATIENT)
Dept: OBGYN CLINIC | Facility: CLINIC | Age: 35
End: 2025-05-25

## 2025-05-29 ENCOUNTER — ROUTINE PRENATAL (OUTPATIENT)
Dept: OBGYN CLINIC | Facility: CLINIC | Age: 35
End: 2025-05-29
Payer: COMMERCIAL

## 2025-05-29 VITALS
SYSTOLIC BLOOD PRESSURE: 112 MMHG | DIASTOLIC BLOOD PRESSURE: 56 MMHG | HEIGHT: 67 IN | WEIGHT: 161 LBS | BODY MASS INDEX: 25.27 KG/M2

## 2025-05-29 DIAGNOSIS — O09.523 MULTIGRAVIDA OF ADVANCED MATERNAL AGE IN THIRD TRIMESTER: Primary | ICD-10-CM

## 2025-05-29 DIAGNOSIS — Z3A.37 37 WEEKS GESTATION OF PREGNANCY: ICD-10-CM

## 2025-05-29 LAB
SL AMB  POCT GLUCOSE, UA: NORMAL
SL AMB POCT URINE PROTEIN: NORMAL

## 2025-05-29 PROCEDURE — PNV: Performed by: OBSTETRICS & GYNECOLOGY

## 2025-05-29 PROCEDURE — 81002 URINALYSIS NONAUTO W/O SCOPE: CPT | Performed by: OBSTETRICS & GYNECOLOGY

## 2025-05-29 NOTE — PROGRESS NOTES
"Routine Prenatal Visit  [facility] - [place]  [address] [place] [address]    Assessment/Plan:  Ashleigh is a 35 y.o. year old  at 37w0d who presents for routine prenatal visit.     1. Multigravida of advanced maternal age in third trimester  2. 37 weeks gestation of pregnancy  Assessment & Plan:  Declined elective IOL at 39 weeks  Orders:  -     POCT urine dip        Subjective:   Ashleigh Lambert is a 35 y.o.  who presents for routine prenatal care at 37w0d.  Complaints today: Denies  LOF: -; VB: -; Contractions: -; FM: +    Objective:  /56 (BP Location: Left arm, Patient Position: Sitting, Cuff Size: Standard)   Ht 5' 7\" (1.702 m)   Wt 73 kg (161 lb)   LMP 2024 (Exact Date)   BMI 25.22 kg/m²     General: Well appearing, no distress  Respiratory: Unlabored breathing  Abdomen: Soft, gravid, nontender  Extremities: Warm and well perfused.  Non tender.    Pregravid Weight/BMI: 61.2 kg (135 lb) (BMI 21.14)  Current Weight: 73 kg (161 lb)   Total Weight Gain: 11.8 kg (26 lb)     Pre-Denise Vitals      Flowsheet Row Most Recent Value   Prenatal Assessment    Fetal Heart Rate 145   Fundal Height (cm) 37 cm   Movement Present   Presentation Vertex   Prenatal Vitals    Blood Pressure 112/56   Weight - Scale 73 kg (161 lb)   Urine Albumin/Glucose    Dilation/Effacement/Station    Cervical Dilation 2.5   Cervical Effacement 50   Fetal Station -3   Vaginal Drainage    Edema              Marjan Banerjee DO  2025 8:26 AM     "

## 2025-05-30 ENCOUNTER — OFFICE VISIT (OUTPATIENT)
Dept: PHYSICAL THERAPY | Facility: CLINIC | Age: 35
End: 2025-05-30
Attending: STUDENT IN AN ORGANIZED HEALTH CARE EDUCATION/TRAINING PROGRAM
Payer: COMMERCIAL

## 2025-05-30 DIAGNOSIS — R10.2 PELVIC PAIN IN PREGNANCY: Primary | ICD-10-CM

## 2025-05-30 DIAGNOSIS — O26.899 PELVIC PAIN IN PREGNANCY: Primary | ICD-10-CM

## 2025-05-30 PROCEDURE — 97110 THERAPEUTIC EXERCISES: CPT | Performed by: PHYSICAL THERAPIST

## 2025-05-30 PROCEDURE — 97112 NEUROMUSCULAR REEDUCATION: CPT | Performed by: PHYSICAL THERAPIST

## 2025-05-30 NOTE — PROGRESS NOTES
"Daily Note     Today's date: 2025  Patient name: Ashleigh Lambert  : 1990  MRN: [id number]  Referring provider: Carlos Gonzales MD  Dx:   Encounter Diagnosis     ICD-10-CM    1. Pelvic pain in pregnancy  O26.899     R10.2                      Subjective:  Pt reports feeling much better with daily use of SI belt with various ADL's. No  pain at this time.      Objective: See treatment diary below  (Met all goals)  1. Independent and safe with HEP.  2. Independent and safe with body mechanics using SI and pregnancy support belt.  3. Independent and safe with bed mobility and log rolling transfer.  4. Improve safe gait with use of SI belt on all surfaces with more comfort.    Assessment: Tolerated treatment well. Patient exhibited good technique with therapeutic exercises and HEP review/update. No pain post tx. Pt is very motivated and willing to continue wit daily HEP. Safe with all ex tech.      Plan: Self D/C wth HEP update/review prior to due date. Return back to [facility] PT for postpartum rehab.     Precautions: (+) pregnancy      Manuals  530          LB TPR  np           B/l AD's stretch  np                                     Neuro Re-Ed                          Rec bike/L-roll/posture   L2 10'          UBE/postureb'kwrd/L-roll  L1 8'                                                               Ther Ex             Supine PF/TA w AD's t-ball  5x3\" VC's           Supine semi bridge w t-ball PF/TA  5x3\" VC's           Sitting PF/TA/AD's t-ball use  10x3\" VC's           Sit to stand w t-ball PF/TA  5x3\" VC's           Cat/cow PF  2x VC's           Child pose stretch  1x30\"           Swiss ball trunk flexion stretch in all dir   3x 20\"          Swiss ball pelvic tilts   10x3\" ea          Swiss ball hip hikes   10x3\"          Swiss ball circles CC/CCW   10x ea dir          Swiss ball HS stretch   3x30\" ea LE          Albanian ball AD's stretch   3x30\" ea LE          Swiss ball hip flex stretch   3x30\"  " "        Swiss ball wall slides   10x5\"          Swiss ball bounce   10x                                                 Ther Activity             HEP eud/review 5'  5' update/review          Postpartum abdominal wrap edu/info   3' info given          Log rolling transfers 5'            SI belt/preg. belt use edu 5' 8' edu again on importance of daily use           L-roll use/postural edu 5'            Gait Training                                       Modalities                                              "

## 2025-06-02 ENCOUNTER — ROUTINE PRENATAL (OUTPATIENT)
Dept: OBGYN CLINIC | Facility: CLINIC | Age: 35
End: 2025-06-02
Payer: COMMERCIAL

## 2025-06-02 VITALS
WEIGHT: 161.6 LBS | HEIGHT: 67 IN | DIASTOLIC BLOOD PRESSURE: 62 MMHG | SYSTOLIC BLOOD PRESSURE: 104 MMHG | BODY MASS INDEX: 25.36 KG/M2

## 2025-06-02 DIAGNOSIS — O09.523 MULTIGRAVIDA OF ADVANCED MATERNAL AGE IN THIRD TRIMESTER: Primary | ICD-10-CM

## 2025-06-02 DIAGNOSIS — Z3A.37 37 WEEKS GESTATION OF PREGNANCY: ICD-10-CM

## 2025-06-02 LAB
SL AMB  POCT GLUCOSE, UA: NORMAL
SL AMB POCT URINE PROTEIN: NORMAL

## 2025-06-02 PROCEDURE — 81002 URINALYSIS NONAUTO W/O SCOPE: CPT | Performed by: OBSTETRICS & GYNECOLOGY

## 2025-06-02 PROCEDURE — PNV: Performed by: OBSTETRICS & GYNECOLOGY

## 2025-06-02 NOTE — PROGRESS NOTES
"Routine Prenatal Visit  [facility] - [place]  [address] [place] [address]    Assessment/Plan:  Ashleigh is a 35 y.o. year old  at 37w4d who presents for routine prenatal visit.       Assessment & Plan  Multigravida of advanced maternal age in third trimester         37 weeks gestation of pregnancy  Cervical exam per request. No significant change in cervix despite an increase in contraction frequency. S/S labor reviewed  Orders:    POCT urine dip      Subjective:     CC: Prenatal care    Ashleigh Lambert is a 35 y.o.  female who presents for routine prenatal care at 37w4d.  Pregnancy ROS: no leakage of fluid, pelvic pain, or vaginal bleeding.  normal fetal movement.    The following portions of the patient's history were reviewed and updated as appropriate: allergies, current medications, past family history, past medical history, obstetric history, gynecologic history, past social history, past surgical history and problem list.      Objective:  /62 (BP Location: Left arm, Patient Position: Sitting, Cuff Size: Standard)   Ht 5' 7\" (1.702 m)   Wt 73.3 kg (161 lb 9.6 oz)   LMP 2024 (Exact Date)   BMI 25.31 kg/m²   Pregravid Weight/BMI: 61.2 kg (135 lb) (BMI 21.14)  Current Weight: 73.3 kg (161 lb 9.6 oz)   Total Weight Gain: 12.1 kg (26 lb 9.6 oz)   Pre-Denise Vitals      Flowsheet Row Most Recent Value   Prenatal Assessment    Fetal Heart Rate 145   Fundal Height (cm) 37 cm   Movement Present   Presentation Vertex   Prenatal Vitals    Blood Pressure 104/62   Weight - Scale 73.3 kg (161 lb 9.6 oz)   Urine Albumin/Glucose    Dilation/Effacement/Station    Cervical Dilation 2.5   Cervical Effacement 60   Fetal Station -1   Vaginal Drainage    Edema              General: Well appearing, no distress  Respiratory: Unlabored breathing  Cardiovascular: Regular rate.  Abdomen: Soft, gravid, nontender  Fundal Height: Appropriate for gestational age.  Extremities: Warm and well " perfused.  Non tender.

## 2025-06-02 NOTE — ASSESSMENT & PLAN NOTE
Cervical exam per request. No significant change in cervix despite an increase in contraction frequency. S/S labor reviewed  Orders:    POCT urine dip    
Admission

## 2025-06-09 ENCOUNTER — ROUTINE PRENATAL (OUTPATIENT)
Dept: OBGYN CLINIC | Facility: CLINIC | Age: 35
End: 2025-06-09
Payer: COMMERCIAL

## 2025-06-09 VITALS — WEIGHT: 162.8 LBS | DIASTOLIC BLOOD PRESSURE: 62 MMHG | SYSTOLIC BLOOD PRESSURE: 108 MMHG | BODY MASS INDEX: 25.5 KG/M2

## 2025-06-09 DIAGNOSIS — O09.523 MULTIGRAVIDA OF ADVANCED MATERNAL AGE IN THIRD TRIMESTER: Primary | ICD-10-CM

## 2025-06-09 DIAGNOSIS — Z3A.38 38 WEEKS GESTATION OF PREGNANCY: ICD-10-CM

## 2025-06-09 LAB
SL AMB  POCT GLUCOSE, UA: NEGATIVE
SL AMB POCT URINE PROTEIN: NEGATIVE

## 2025-06-09 PROCEDURE — 81002 URINALYSIS NONAUTO W/O SCOPE: CPT | Performed by: STUDENT IN AN ORGANIZED HEALTH CARE EDUCATION/TRAINING PROGRAM

## 2025-06-09 PROCEDURE — PNV: Performed by: STUDENT IN AN ORGANIZED HEALTH CARE EDUCATION/TRAINING PROGRAM

## 2025-06-09 NOTE — PROGRESS NOTES
Routine Prenatal Visit  [facility] - [place]  [address] [place] [address]  Assessment & Plan  Multigravida of advanced maternal age in third trimester         38 weeks gestation of pregnancy  - Labor/Bleeding/ROM precautions given. Kick counts reviewed.  - GBS negative result reviewed.   - Reviewed timing of delivery, including option for elective induction of labor as early as 39 weeks versus awaiting spontaneous onset of labor. Patient desires expectant management for now. Will plan for re-assessment next week.   - Problem list updated, results console reviewed and updated with pertinent prenatal labs.  - PMH, PSH, medications reviewed and updated as needed  - Return to office in 1 wk(s) for routine prenatal care    Orders:  •  POCT urine dip    Subjective:   Ashleigh Lambert is a 35 y.o.  who presents for routine prenatal care at 38w4d.  Complaints today: None - change in quality of movement, but consistent movement is present, including sensation of fetal breathing.   LOF: No; VB: No; Contractions: Irregular, mild; FM: Present    Objective:  /62   Wt 73.8 kg (162 lb 12.8 oz)   LMP 2024 (Exact Date)   BMI 25.50 kg/m²     General: Well appearing, no distress  Respiratory: Unlabored breathing  Cardiovascular: Regular rate.  Abdomen: Soft, gravid, nontender  Extremities: Warm and well perfused.  Non tender.    Pregravid Weight/BMI: 61.2 kg (135 lb) (BMI 21.14)  Current Weight: 73.8 kg (162 lb 12.8 oz)   Total Weight Gain: 12.6 kg (27 lb 12.8 oz)     Pre- Vitals    Flowsheet Row Most Recent Value   Prenatal Assessment    Fetal Heart Rate 130   Fundal Height (cm) 38 cm   Movement Present   Presentation Vertex   Prenatal Vitals    Blood Pressure 108/62   Weight - Scale 73.8 kg (162 lb 12.8 oz)   Urine Albumin/Glucose    Dilation/Effacement/Station    Cervical Dilation 2.5   Cervical Effacement 60   Fetal Station -2   Vaginal Drainage    Draining Fluid No   Edema    LLE Edema  None   RLE Edema None   Facial Edema None           Carlos Gonzales MD  6/9/2025 8:57 AM

## 2025-06-09 NOTE — ASSESSMENT & PLAN NOTE
- Labor/Bleeding/ROM precautions given. Kick counts reviewed.  - GBS negative result reviewed.   - Reviewed timing of delivery, including option for elective induction of labor as early as 39 weeks versus awaiting spontaneous onset of labor. Patient desires expectant management for now. Will plan for re-assessment next week.   - Problem list updated, results console reviewed and updated with pertinent prenatal labs.  - PMH, PSH, medications reviewed and updated as needed  - Return to office in 1 wk(s) for routine prenatal care    Orders:  •  POCT urine dip

## 2025-06-14 ENCOUNTER — NURSE TRIAGE (OUTPATIENT)
Dept: OTHER | Facility: OTHER | Age: 35
End: 2025-06-14

## 2025-06-15 ENCOUNTER — HOSPITAL ENCOUNTER (OUTPATIENT)
Facility: HOSPITAL | Age: 35
Discharge: HOME | End: 2025-06-15
Attending: STUDENT IN AN ORGANIZED HEALTH CARE EDUCATION/TRAINING PROGRAM | Admitting: STUDENT IN AN ORGANIZED HEALTH CARE EDUCATION/TRAINING PROGRAM
Payer: COMMERCIAL

## 2025-06-15 VITALS
DIASTOLIC BLOOD PRESSURE: 57 MMHG | HEART RATE: 84 BPM | OXYGEN SATURATION: 97 % | TEMPERATURE: 97.8 F | SYSTOLIC BLOOD PRESSURE: 107 MMHG | RESPIRATION RATE: 18 BRPM

## 2025-06-15 PROBLEM — Z3A.39 39 WEEKS GESTATION OF PREGNANCY: Status: ACTIVE | Noted: 2025-06-15

## 2025-06-15 LAB
ABO GROUP BLD: NORMAL
BLD GP AB SCN SERPL QL: NEGATIVE
ERYTHROCYTE [DISTWIDTH] IN BLOOD BY AUTOMATED COUNT: 12.6 % (ref 11.6–15.1)
HCT VFR BLD AUTO: 36.1 % (ref 34.8–46.1)
HGB BLD-MCNC: 12.4 G/DL (ref 11.5–15.4)
MCH RBC QN AUTO: 30 PG (ref 26.8–34.3)
MCHC RBC AUTO-ENTMCNC: 34.3 G/DL (ref 31.4–37.4)
MCV RBC AUTO: 87 FL (ref 82–98)
PLATELET # BLD AUTO: 237 THOUSANDS/UL (ref 149–390)
PMV BLD AUTO: 10.8 FL (ref 8.9–12.7)
RBC # BLD AUTO: 4.14 MILLION/UL (ref 3.81–5.12)
RH BLD: POSITIVE
SPECIMEN EXPIRATION DATE: NORMAL
WBC # BLD AUTO: 14.34 THOUSAND/UL (ref 4.31–10.16)

## 2025-06-15 PROCEDURE — NC001 PR NO CHARGE: Performed by: OBSTETRICS & GYNECOLOGY

## 2025-06-15 PROCEDURE — 86900 BLOOD TYPING SEROLOGIC ABO: CPT | Performed by: OBSTETRICS & GYNECOLOGY

## 2025-06-15 PROCEDURE — 99212 OFFICE O/P EST SF 10 MIN: CPT | Performed by: OBSTETRICS & GYNECOLOGY

## 2025-06-15 PROCEDURE — 86901 BLOOD TYPING SEROLOGIC RH(D): CPT | Performed by: OBSTETRICS & GYNECOLOGY

## 2025-06-15 PROCEDURE — 86850 RBC ANTIBODY SCREEN: CPT | Performed by: OBSTETRICS & GYNECOLOGY

## 2025-06-15 PROCEDURE — 59025 FETAL NON-STRESS TEST: CPT | Performed by: OBSTETRICS & GYNECOLOGY

## 2025-06-15 PROCEDURE — 85027 COMPLETE CBC AUTOMATED: CPT | Performed by: OBSTETRICS & GYNECOLOGY

## 2025-06-15 PROCEDURE — 99213 OFFICE O/P EST LOW 20 MIN: CPT

## 2025-06-15 RX ORDER — BUTORPHANOL TARTRATE 1 MG/ML
1 INJECTION, SOLUTION INTRAMUSCULAR; INTRAVENOUS ONCE
Status: COMPLETED | OUTPATIENT
Start: 2025-06-15 | End: 2025-06-15

## 2025-06-15 RX ORDER — SODIUM CHLORIDE, SODIUM LACTATE, POTASSIUM CHLORIDE, CALCIUM CHLORIDE 600; 310; 30; 20 MG/100ML; MG/100ML; MG/100ML; MG/100ML
125 INJECTION, SOLUTION INTRAVENOUS CONTINUOUS
Status: DISCONTINUED | OUTPATIENT
Start: 2025-06-15 | End: 2025-06-15 | Stop reason: HOSPADM

## 2025-06-15 RX ADMIN — BUTORPHANOL TARTRATE 1 MG: 1 INJECTION, SOLUTION INTRAMUSCULAR; INTRAVENOUS at 01:37

## 2025-06-15 RX ADMIN — SODIUM CHLORIDE, SODIUM LACTATE, POTASSIUM CHLORIDE, AND CALCIUM CHLORIDE 1000 ML: .6; .31; .03; .02 INJECTION, SOLUTION INTRAVENOUS at 01:16

## 2025-06-15 NOTE — PROCEDURES
Ashleigh Lambert, a  at 39w3d with an RAFFI of 2025, by Last Menstrual Period, was seen at [facility] LABOR AND DELIVERY for the following procedure(s):  ]           NST  Baseline 140's.  Variability moderate  accels yes  decels no  Ctx- no    Reactive NST    Pt has appt for fu   Currently has no complaints

## 2025-06-15 NOTE — PROCEDURES
Ashleigh Lambert, a  at 39w3d with an RAFFI of 2025, by Last Menstrual Period, was seen at [facility] DELIVERY for the following procedure(s):  ]            NST  Baseline 140's  Variability moderate  accels yes  decels no  Ctx- no    Reactive NST    Pt has appt for fu   Currently has no complaints

## 2025-06-15 NOTE — H&P
History & Physical - OB/GYN   Ashleigh Lambert 35 y.o. female MRN: [id number]  Unit/Bed#: -01 Encounter: [id number]    35 y.o. yo  at 39w3d with RAFFI of 2025, by Last Menstrual Period.    She is a patient of [facility] [place].    Chief complaint:  I was having contractions last night    HPI:  Pt sound asleep in labor room.   States contraction have gone away since arriving to L&D    Contractions:  yes  Fetal movement:  yes  Vaginal bleeding:   no  Leaking of fluid:  no      Pregnancy Complications:  Pregnancy Problems (from 24 to present)       Problem Noted Diagnosed Resolved    38 weeks gestation of pregnancy 12/10/2024 by JENNIE Mcdonald  No    Overview Signed 2025 12:57 PM by Shashi Antonio MD   Tdap 25         Multigravida of advanced maternal age in third trimester 2024 by Dante Espana MD  No    Overview Signed 12/10/2024  9:04 AM by JENNIE Mcdonald   Taking low dose ASA therapy for AMA, >10 years since last pregnancy, continue until 36 weeks.                 PMH:  Past Medical History[1]    PSH:  Past Surgical History[2]    Social Hx:  Social History[3]       OB Hx:  OB History    Para Term  AB Living   2 1 1 0 0 1   SAB IAB Ectopic Multiple Live Births   0 0 0 0 1      # Outcome Date GA Lbr Fredy/2nd Weight Sex Type Anes PTL Lv   2 Current            1 Term 03/06/10 38w0d  3345 g (7 lb 6 oz) M Vag-Spont EPI N IDALIA       Meds:  Medications Ordered Prior to Encounter[4]    Allergies:  Allergies[5]    Labs:  ABO Grouping   Date Value Ref Range Status   06/15/2025 O  Final      Rh Factor   Date Value Ref Range Status   06/15/2025 Positive  Final     Rh Type   Date Value Ref Range Status   2024 Positive  Final     Comment:     Please note: Prior records for this patient's ABO / Rh type are not  available for additional verification.        Rh Factor   Date Value Ref Range Status   06/15/2025 Positive  Final     Rh Type  "  Date Value Ref Range Status   2024 Positive  Final     Comment:     Please note: Prior records for this patient's ABO / Rh type are not  available for additional verification.       HIV-1/HIV-2 AB   Date Value Ref Range Status   2024 Non-Reactive  Final     Hepatitis B Surface Ag   Date Value Ref Range Status   2024 Negative  Final     HEP C AB   Date Value Ref Range Status   2024 Non Reactive Non Reactive Final     RPR   Date Value Ref Range Status   2025 Non Reactive Non Reactive Final     No results found for: \"RUBELLAIGGQT\"  Glucose   Date Value Ref Range Status   2025 100 70 - 139 mg/dL Final     Comment:     According to ADA, a glucose threshold of >139 mg/dL after 50-gram  load identifies approximately 80% of women with gestational  diabetes mellitus, while the sensitivity is further increased to  approximately 90% by a threshold of >129 mg/dL.       No results found for: \"KTOROEC7FL\"  Strep Grp B FERNANDA   Date Value Ref Range Status   2025 Negative Negative Final     Comment:     Centers for Disease Control and Prevention (CDC) and American Congress  of Obstetricians and Gynecologists (ACOG) guidelines for prevention of   group B streptococcal (GBS) disease specify co-collection of  a vaginal and rectal swab specimen to maximize sensitivity of GBS  detection. Per the CDC and ACOG, swabbing both the lower vagina and  rectum substantially increases the yield of detection compared with  sampling the vagina alone.  Penicillin G, ampicillin, or cefazolin are indicated for intrapartum  prophylaxis of  GBS colonization. Reflex susceptibility  testing should be performed prior to use of clindamycin only on GBS  isolates from penicillin-allergic women who are considered a high risk  for anaphylaxis. Treatment with vancomycin without additional testing  is warranted if resistance to clindamycin is noted.         Physical Exam:  /57   Pulse 84   Temp " 97.8 °F (36.6 °C) (Oral)   Resp 18   LMP 2024 (Exact Date)   SpO2 97%     Gen: NAD/ uncomfortable with contractions  Heart: RRR  Lungs: CTA b/l  Abdomen: gravid, non-tender, non-distended  Extremities: non-tender, no edema    Estimated Fetal Weight: 7 lbs  Presentation: Vertex    SVE: Cervical Dilation: 2-3  Cervical Effacement: 60  Cervical Consistency: Soft  Fetal Station: -2  Position: Unknown  Method: Manual  OB Examiner: Griselda    FHT:  Baseline Rate (FHR): 125 bpm  Variability: Moderate (to minimal)  Accelerations: 15 x 15 or greater, At variable times  Decelerations: None  TOCO:   Contraction Frequency (minutes): irregular, mild  Contraction Duration (seconds):   Contraction Intensity: Mild    Membranes: intact    Assessment:   35 y.o.  at 39w3d weeks presents with painful contractions that resolved.    Plan:    No cervical change noted  Si/sx of labor, LOF, VB and Kick counts reviewed.  Discharged to home with precautions.               [1]   Past Medical History:  Diagnosis Date    Depression     baby blues @ 10 months (after d/c breatfeeding) x 3 months duration - no meds or counseling    Migraine     Ovarian cyst     PID (acute pelvic inflammatory disease)     Urinary tract infection     Varicella     pt had chickenpox in childhood    Yeast infection    [2]   Past Surgical History:  Procedure Laterality Date    NO PAST SURGERIES     [3]   Social History  Tobacco Use    Smoking status: Never     Passive exposure: Never    Smokeless tobacco: Never   Vaping Use    Vaping status: Never Used   Substance Use Topics    Alcohol use: Not Currently     Comment: social    Drug use: No   [4]   No current facility-administered medications on file prior to encounter.     Current Outpatient Medications on File Prior to Encounter   Medication Sig Dispense Refill    CHOLINE PO Take by mouth      loratadine (CLARITIN REDITABS) 10 MG dissolvable tablet Take 10 mg by mouth in the morning.       magnesium (MAGTAB) 84 MG (7MEQ) TBCR Take 84 mg by mouth in the morning.      Prenatal-FeFum-FA-DHA w/o A (PRENATAL + DHA PO) Take 2 tablets by mouth in the morning with Choline     [5] No Known Allergies

## 2025-06-15 NOTE — TELEPHONE ENCOUNTER
"REASON FOR CONVERSATION: Contractions    SYMPTOMS: contractions 2-4 mins currently. < Q 10 mins for over 2 hours     OTHER HEALTH INFORMATION: 39W2D  RAFFI:   Prior   Baseline FM    PROTOCOL DISPOSITION: Go to LD Now    CARE ADVICE PROVIDED: LD    PRACTICE FOLLOW-UP: LD      Reason for Disposition   [1] History of prior delivery (multipara) AND [2] contractions < 10 minutes apart AND [3] present 1 hour    Answer Assessment - Initial Assessment Questions  1. ONSET: \"When did the symptoms begin?\"           2. CONTRACTIONS: \"Describe the contractions that you are having.\" (e.g., duration, frequency, regularity, severity)      Q2-4 mins    3. PREGNANCY: \"How many weeks pregnant are you?\"      39W2D  4. RAFFI: \"What date are you expecting to deliver?\"        5. PARITY: \"Have you had a baby before?\" If Yes, ask: \"How long did the labor last?\"        6. FETAL MOVEMENT: \"Has the baby's movement decreased or changed significantly from normal?\"      Baseline   7. OTHER SYMPTOMS: \"Do you have any other symptoms?\" (e.g., leaking fluid from vagina, vaginal bleeding, fever, hand/facial swelling)      Nausea    Protocols used: Pregnancy - Labor-Adult-AH    "

## 2025-06-15 NOTE — TELEPHONE ENCOUNTER
39W2D  RAFFI:   Prior   Baseline FM  Contractions currently Q2-4 mins, being < Q10 mins for over 2 hours.    No additional symptom.   ETA 20 mins.  Clinical team (On-call Provider & Charge Nurse) informed of pending arrival

## 2025-06-16 ENCOUNTER — ROUTINE PRENATAL (OUTPATIENT)
Dept: OBGYN CLINIC | Facility: CLINIC | Age: 35
End: 2025-06-16

## 2025-06-16 VITALS
DIASTOLIC BLOOD PRESSURE: 60 MMHG | BODY MASS INDEX: 25.74 KG/M2 | WEIGHT: 164 LBS | SYSTOLIC BLOOD PRESSURE: 104 MMHG | HEIGHT: 67 IN

## 2025-06-16 DIAGNOSIS — O09.523 MULTIGRAVIDA OF ADVANCED MATERNAL AGE IN THIRD TRIMESTER: Primary | ICD-10-CM

## 2025-06-16 DIAGNOSIS — Z3A.39 39 WEEKS GESTATION OF PREGNANCY: ICD-10-CM

## 2025-06-16 PROCEDURE — PNV: Performed by: OBSTETRICS & GYNECOLOGY

## 2025-06-16 NOTE — ASSESSMENT & PLAN NOTE
Pt in L+D over weekend but contractions stopped. Interested in labor induction at the end of the week or early next week. First available with [facility] is Monday 6/23/25. Scheduled for 0730. Reviewed S/S labor.

## 2025-06-16 NOTE — PROGRESS NOTES
"Routine Prenatal Visit  [facility] [place]  [address] [place] [address]    Assessment/Plan:  Ashleigh is a 35 y.o. year old  at 39w4d who presents for routine prenatal visit.       Assessment & Plan  Multigravida of advanced maternal age in third trimester         39 weeks gestation of pregnancy  Pt in L+D over weekend but contractions stopped. Interested in labor induction at the end of the week or early next week. First available with SOG is 25. Scheduled for 730. Reviewed S/S labor.         Subjective:     CC: Prenatal care    Ashleigh Lambert is a 35 y.o.  female who presents for routine prenatal care at 39w4d.  Pregnancy ROS: no leakage of fluid, pelvic pain, or vaginal bleeding.  normal fetal movement.    The following portions of the patient's history were reviewed and updated as appropriate: allergies, current medications, past family history, past medical history, obstetric history, gynecologic history, past social history, past surgical history and problem list.      Objective:  /60 (BP Location: Left arm, Patient Position: Sitting, Cuff Size: Standard)   Ht 5' 7\" (1.702 m)   Wt 74.4 kg (164 lb)   LMP 2024 (Exact Date)   BMI 25.69 kg/m²   Pregravid Weight/BMI: 61.2 kg (135 lb) (BMI 21.14)  Current Weight: 74.4 kg (164 lb)   Total Weight Gain: 13.2 kg (29 lb)   Pre- Vitals      Flowsheet Row Most Recent Value   Prenatal Assessment    Fetal Heart Rate 140   Fundal Height (cm) 39 cm   Movement Present   Presentation Vertex   Prenatal Vitals    Blood Pressure 104/60   Weight - Scale 74.4 kg (164 lb)   Urine Albumin/Glucose    Dilation/Effacement/Station    Cervical Dilation 4   Cervical Effacement 60   Fetal Station -2   Vaginal Drainage    Edema              General: Well appearing, no distress  Respiratory: Unlabored breathing  Cardiovascular: Regular rate.  Abdomen: Soft, gravid, nontender  Fundal Height: Appropriate for gestational " age.  Extremities: Warm and well perfused.  Non tender.

## 2025-06-18 ENCOUNTER — TELEPHONE (OUTPATIENT)
Age: 35
End: 2025-06-18

## 2025-06-18 NOTE — TELEPHONE ENCOUNTER
Pt is currently scheduled for IOL on Monday, 6/23/25. She is asking if IOL can be moved to Tuesday, 6/25/25. Please advise. Thank you.

## 2025-06-19 ENCOUNTER — TELEPHONE (OUTPATIENT)
Age: 35
End: 2025-06-19

## 2025-06-19 NOTE — TELEPHONE ENCOUNTER
Spoke with patient who is 40w0d, . She advised she was 4cm dilated at her last appointment and wants to know if she can go swimming.  Will discuss with provider and call back.

## 2025-06-19 NOTE — TELEPHONE ENCOUNTER
Spoke w/pt, reviewed there is no availability at [facility] L/D on Tuesday and a [facility] provider is on call on Wednesday.  Pt states she will keep 6/23/25 IOL.  Reviewed s/s labor and when to call the office, verbalized understanding

## 2025-06-23 ENCOUNTER — ANESTHESIA (INPATIENT)
Dept: ANESTHESIOLOGY | Facility: HOSPITAL | Age: 35
End: 2025-06-23
Payer: COMMERCIAL

## 2025-06-23 ENCOUNTER — ANESTHESIA EVENT (INPATIENT)
Dept: ANESTHESIOLOGY | Facility: HOSPITAL | Age: 35
End: 2025-06-23
Payer: COMMERCIAL

## 2025-06-23 ENCOUNTER — HOSPITAL ENCOUNTER (INPATIENT)
Facility: HOSPITAL | Age: 35
LOS: 1 days | Discharge: HOME | End: 2025-06-24
Attending: OBSTETRICS & GYNECOLOGY | Admitting: OBSTETRICS & GYNECOLOGY
Payer: COMMERCIAL

## 2025-06-23 LAB
ABO GROUP BLD: NORMAL
BASE EXCESS BLDCOA CALC-SCNC: -4.7 MMOL/L (ref 3–11)
BASE EXCESS BLDCOV CALC-SCNC: -5.7 MMOL/L (ref 1–9)
BLD GP AB SCN SERPL QL: NEGATIVE
ERYTHROCYTE [DISTWIDTH] IN BLOOD BY AUTOMATED COUNT: 12.5 % (ref 11.6–15.1)
HCO3 BLDCOA-SCNC: 20.6 MMOL/L (ref 17.3–27.3)
HCO3 BLDCOV-SCNC: 19.4 MMOL/L (ref 12.2–28.6)
HCT VFR BLD AUTO: 34.9 % (ref 34.8–46.1)
HGB BLD-MCNC: 12.1 G/DL (ref 11.5–15.4)
HOLD SPECIMEN: NORMAL
MCH RBC QN AUTO: 30.4 PG (ref 26.8–34.3)
MCHC RBC AUTO-ENTMCNC: 34.7 G/DL (ref 31.4–37.4)
MCV RBC AUTO: 88 FL (ref 82–98)
O2 CT VFR BLDCOA CALC: 15.2 ML/DL
OXYHGB MFR BLDCOA: 72.2 %
OXYHGB MFR BLDCOV: 73 %
PCO2 BLDCOA: 39.4 MM[HG] (ref 30–60)
PCO2 BLDCOV: 36.9 MM HG (ref 27–43)
PH BLDCOA: 7.34 [PH] (ref 7.23–7.43)
PH BLDCOV: 7.34 [PH] (ref 7.19–7.49)
PLATELET # BLD AUTO: 211 THOUSANDS/UL (ref 149–390)
PMV BLD AUTO: 10.7 FL (ref 8.9–12.7)
PO2 BLDCOA: 30.8 MM HG (ref 5–25)
PO2 BLDCOV: 31 MM HG (ref 15–45)
RBC # BLD AUTO: 3.98 MILLION/UL (ref 3.81–5.12)
RH BLD: POSITIVE
SAO2 % BLDCOV: 15.5 ML/DL
SPECIMEN EXPIRATION DATE: NORMAL
WBC # BLD AUTO: 11.16 THOUSAND/UL (ref 4.31–10.16)

## 2025-06-23 PROCEDURE — 86850 RBC ANTIBODY SCREEN: CPT | Performed by: OBSTETRICS & GYNECOLOGY

## 2025-06-23 PROCEDURE — 3E033VJ INTRODUCTION OF OTHER HORMONE INTO PERIPHERAL VEIN, PERCUTANEOUS APPROACH: ICD-10-PCS | Performed by: OBSTETRICS & GYNECOLOGY

## 2025-06-23 PROCEDURE — 85027 COMPLETE CBC AUTOMATED: CPT | Performed by: OBSTETRICS & GYNECOLOGY

## 2025-06-23 PROCEDURE — 86900 BLOOD TYPING SEROLOGIC ABO: CPT | Performed by: OBSTETRICS & GYNECOLOGY

## 2025-06-23 PROCEDURE — 88307 TISSUE EXAM BY PATHOLOGIST: CPT | Performed by: PATHOLOGY

## 2025-06-23 PROCEDURE — 86901 BLOOD TYPING SEROLOGIC RH(D): CPT | Performed by: OBSTETRICS & GYNECOLOGY

## 2025-06-23 PROCEDURE — NC001 PR NO CHARGE: Performed by: OBSTETRICS & GYNECOLOGY

## 2025-06-23 PROCEDURE — 86780 TREPONEMA PALLIDUM: CPT | Performed by: OBSTETRICS & GYNECOLOGY

## 2025-06-23 PROCEDURE — 82805 BLOOD GASES W/O2 SATURATION: CPT | Performed by: OBSTETRICS & GYNECOLOGY

## 2025-06-23 PROCEDURE — 59400 OBSTETRICAL CARE: CPT | Performed by: ADVANCED PRACTICE MIDWIFE

## 2025-06-23 RX ORDER — DOCUSATE SODIUM 100 MG/1
100 CAPSULE, LIQUID FILLED ORAL 2 TIMES DAILY
Status: DISCONTINUED | OUTPATIENT
Start: 2025-06-23 | End: 2025-06-24 | Stop reason: HOSPADM

## 2025-06-23 RX ORDER — BUPIVACAINE HYDROCHLORIDE 2.5 MG/ML
INJECTION, SOLUTION EPIDURAL; INFILTRATION; INTRACAUDAL; PERINEURAL AS NEEDED
Status: DISCONTINUED | OUTPATIENT
Start: 2025-06-23 | End: 2025-06-23 | Stop reason: HOSPADM

## 2025-06-23 RX ORDER — IBUPROFEN 600 MG/1
600 TABLET, FILM COATED ORAL EVERY 6 HOURS
Status: DISCONTINUED | OUTPATIENT
Start: 2025-06-23 | End: 2025-06-24 | Stop reason: HOSPADM

## 2025-06-23 RX ORDER — BUPIVACAINE HYDROCHLORIDE 2.5 MG/ML
30 INJECTION, SOLUTION EPIDURAL; INFILTRATION; INTRACAUDAL; PERINEURAL ONCE AS NEEDED
Status: DISCONTINUED | OUTPATIENT
Start: 2025-06-23 | End: 2025-06-24 | Stop reason: HOSPADM

## 2025-06-23 RX ORDER — ONDANSETRON 2 MG/ML
4 INJECTION INTRAMUSCULAR; INTRAVENOUS EVERY 6 HOURS PRN
Status: DISCONTINUED | OUTPATIENT
Start: 2025-06-23 | End: 2025-06-24 | Stop reason: HOSPADM

## 2025-06-23 RX ORDER — OXYTOCIN/0.9 % SODIUM CHLORIDE 30/500 ML
1-30 PLASTIC BAG, INJECTION (ML) INTRAVENOUS
Status: DISCONTINUED | OUTPATIENT
Start: 2025-06-23 | End: 2025-06-24 | Stop reason: HOSPADM

## 2025-06-23 RX ORDER — BUTORPHANOL TARTRATE 1 MG/ML
1 INJECTION, SOLUTION INTRAMUSCULAR; INTRAVENOUS
Status: DISCONTINUED | OUTPATIENT
Start: 2025-06-23 | End: 2025-06-24 | Stop reason: HOSPADM

## 2025-06-23 RX ORDER — ACETAMINOPHEN 325 MG/1
650 TABLET ORAL EVERY 4 HOURS PRN
Status: DISCONTINUED | OUTPATIENT
Start: 2025-06-23 | End: 2025-06-24 | Stop reason: HOSPADM

## 2025-06-23 RX ORDER — OXYTOCIN/0.9 % SODIUM CHLORIDE 30/500 ML
250 PLASTIC BAG, INJECTION (ML) INTRAVENOUS ONCE
Status: COMPLETED | OUTPATIENT
Start: 2025-06-23 | End: 2025-06-23

## 2025-06-23 RX ORDER — SODIUM CHLORIDE, SODIUM LACTATE, POTASSIUM CHLORIDE, CALCIUM CHLORIDE 600; 310; 30; 20 MG/100ML; MG/100ML; MG/100ML; MG/100ML
125 INJECTION, SOLUTION INTRAVENOUS CONTINUOUS
Status: DISCONTINUED | OUTPATIENT
Start: 2025-06-23 | End: 2025-06-24 | Stop reason: HOSPADM

## 2025-06-23 RX ORDER — BENZOCAINE/MENTHOL 6 MG-10 MG
1 LOZENGE MUCOUS MEMBRANE DAILY PRN
Status: DISCONTINUED | OUTPATIENT
Start: 2025-06-23 | End: 2025-06-24 | Stop reason: HOSPADM

## 2025-06-23 RX ADMIN — DOCUSATE SODIUM 100 MG: 100 CAPSULE, LIQUID FILLED ORAL at 17:23

## 2025-06-23 RX ADMIN — BENZOCAINE AND LEVOMENTHOL 1 APPLICATION: 200; 5 SPRAY TOPICAL at 17:23

## 2025-06-23 RX ADMIN — ACETAMINOPHEN 650 MG: 325 TABLET, FILM COATED ORAL at 17:44

## 2025-06-23 RX ADMIN — ROPIVACAINE HYDROCHLORIDE: 2 INJECTION, SOLUTION EPIDURAL; INFILTRATION at 11:00

## 2025-06-23 RX ADMIN — BUPIVACAINE HYDROCHLORIDE 10 ML: 2.5 INJECTION, SOLUTION EPIDURAL; INFILTRATION; INTRACAUDAL; PERINEURAL at 10:51

## 2025-06-23 RX ADMIN — ONDANSETRON 4 MG: 2 INJECTION, SOLUTION INTRAMUSCULAR; INTRAVENOUS at 10:34

## 2025-06-23 RX ADMIN — IBUPROFEN 600 MG: 600 TABLET ORAL at 15:31

## 2025-06-23 RX ADMIN — Medication 2 MILLI-UNITS/MIN: at 09:03

## 2025-06-23 RX ADMIN — SODIUM CHLORIDE, SODIUM LACTATE, POTASSIUM CHLORIDE, AND CALCIUM CHLORIDE 125 ML/HR: .6; .31; .03; .02 INJECTION, SOLUTION INTRAVENOUS at 09:28

## 2025-06-23 RX ADMIN — IBUPROFEN 600 MG: 600 TABLET ORAL at 21:48

## 2025-06-23 RX ADMIN — Medication 250 MILLI-UNITS/MIN: at 13:45

## 2025-06-23 RX ADMIN — SODIUM CHLORIDE, SODIUM LACTATE, POTASSIUM CHLORIDE, AND CALCIUM CHLORIDE 125 ML/HR: .6; .31; .03; .02 INJECTION, SOLUTION INTRAVENOUS at 09:04

## 2025-06-23 NOTE — OB LABOR/OXYTOCIN SAFETY PROGRESS
Oxytocin Safety Progress Check Note - Ashleigh Lambert 35 y.o. female MRN: [id number]    Unit/Bed#: -01 Encounter: [id number]    Dose (aleah-units/min) Oxytocin: 6 aleah-units/min  Contraction Frequency (minutes): 3-5  Contraction Intensity: Mild/Moderate  Uterine Activity Characteristics: Irregular  Cervical Dilation: 10        Cervical Effacement: 100  Fetal Station: -1  Baseline Rate (FHR): 140 bpm  Fetal Heart Rate (FHT): 150 BPM  FHR Category: I       Vital Signs:   Vitals:    06/23/25 1059   BP:    Resp:    Temp: 97.8 °F (36.6 °C)       Notes/comments:  Pt comfortable.  Will allow to labor down.  Will recheck in 1 hour or sooner if patient desires        Naa Clark DO 6/23/2025 11:31 AM

## 2025-06-23 NOTE — ANESTHESIA POSTPROCEDURE EVALUATION
Post-Op Assessment Note    CV Status:  Stable    Pain management: adequate      Post-op block assessment: site cleaned, catheter intact and no complications   Mental Status:  Alert and awake   Hydration Status:  Euvolemic and stable   PONV Controlled:  None   Airway Patency:  Patent     Post Op Vitals Reviewed: Yes    No anethesia notable event occurred.    Staff: Anesthesiologist           Last Filed PACU Vitals:  Vitals Value Taken Time   Temp     Pulse 69 06/23/25 15:33   /59 06/23/25 15:33   Resp     SpO2     Vitals shown include unfiled device data.

## 2025-06-23 NOTE — ANESTHESIA PROCEDURE NOTES
Epidural Block    Patient location during procedure: OB/L&D  Start time: 6/23/2025 10:49 AM  Reason for block: procedure for pain  Staffing  Performed by: Johnathan Galeano MD  Authorized by: Johnathan Galeano MD    Preanesthetic Checklist  Completed: patient identified, IV checked, site marked, risks and benefits discussed, surgical consent, monitors and equipment checked, pre-op evaluation and timeout performed  Epidural  Patient position: sitting  Prep: ChloraPrep  Sedation Level: no sedation  Patient monitoring: frequent blood pressure checks, continuous pulse oximetry and heart rate  Approach: midline  Location: lumbar, L3-4  Injection technique: BEST saline and BEST air  Needle  Needle type: Tuohy   Needle gauge: 18 G  Catheter type: multi-orifice  Catheter size: 20 G  Catheter at skin depth: 12 cm  Catheter securement method: stabilization device and clear occlusive dressing  Test dose: negative  Assessment  Sensory level: T10  Number of attempts: 1negative aspiration for CSF, negative aspiration for heme and no paresthesia on injection  patient tolerated the procedure well with no immediate complications

## 2025-06-23 NOTE — PLAN OF CARE
Problem: PAIN - ADULT  Goal: Verbalizes/displays adequate comfort level or baseline comfort level  Description: Interventions:  - Encourage patient to monitor pain and request assistance  - Assess pain using appropriate pain scale  - Administer analgesics as ordered based on type and severity of pain and evaluate response  - Implement non-pharmacological measures as appropriate and evaluate response  - Consider cultural and social influences on pain and pain management  - Notify physician/advanced practitioner if interventions unsuccessful or patient reports new pain  - Educate patient/family on pain management process including their role and importance of  reporting pain   - Provide non-pharmacologic/complimentary pain relief interventions  Outcome: Progressing     Problem: INFECTION - ADULT  Goal: Absence or prevention of progression during hospitalization  Description: INTERVENTIONS:  - Assess and monitor for signs and symptoms of infection  - Monitor lab/diagnostic results  - Monitor all insertion sites, i.e. indwelling lines, tubes, and drains  - Monitor endotracheal if appropriate and nasal secretions for changes in amount and color  - [place] appropriate cooling/warming therapies per order  - Administer medications as ordered  - Instruct and encourage patient and family to use good hand hygiene technique  - Identify and instruct in appropriate isolation precautions for identified infection/condition  Outcome: Progressing  Goal: Absence of fever/infection during neutropenic period  Description: INTERVENTIONS:  - Monitor WBC  - Perform strict hand hygiene  - Limit to healthy visitors only  - No plants, dried, fresh or silk flowers with yeung in patient room  Outcome: Progressing     Problem: SAFETY ADULT  Goal: Patient will remain free of falls  Description: INTERVENTIONS:  - Educate patient/family on patient safety including physical limitations  - Instruct patient to call for assistance with activity   -  Consider consulting OT/PT to assist with strengthening/mobility based on AM PAC & JH-HLM score  - Consult OT/PT to assist with strengthening/mobility   - Keep Call bell within reach  - Keep bed low and locked with side rails adjusted as appropriate  - Keep care items and personal belongings within reach  - Initiate and maintain comfort rounds  - Make Fall Risk Sign visible to staff  - Offer Toileting every 2 Hours, in advance of need  - Initiate/Maintain NO alarm  - Obtain necessary fall risk management equipment: non slip socks  - Apply yellow socks and bracelet for high fall risk patients  - Consider moving patient to room near nurses station  Outcome: Progressing  Goal: Maintain or return to baseline ADL function  Description: INTERVENTIONS:  -  Assess patient's ability to carry out ADLs; assess patient's baseline for ADL function and identify physical deficits which impact ability to perform ADLs (bathing, care of mouth/teeth, toileting, grooming, dressing, etc.)  - Assess/evaluate cause of self-care deficits   - Assess range of motion  - Assess patient's mobility; develop plan if impaired  - Assess patient's need for assistive devices and provide as appropriate  - Encourage maximum independence but intervene and supervise when necessary  - Involve family in performance of ADLs  - Assess for home care needs following discharge   - Consider OT consult to assist with ADL evaluation and planning for discharge  - Provide patient education as appropriate  - Monitor functional capacity and physical performance, use of AM PAC & JH-HLM   - Monitor gait, balance and fatigue with ambulation    Outcome: Progressing     Problem: DISCHARGE PLANNING  Goal: Discharge to home or other facility with appropriate resources  Description: INTERVENTIONS:  - Identify barriers to discharge w/patient and caregiver  - Arrange for needed discharge resources and transportation as appropriate  - Identify discharge learning needs (meds,  wound care, etc.)  - Arrange for interpretive services to assist at discharge as needed  - Refer to Case Management Department for coordinating discharge planning if the patient needs post-hospital services based on physician/advanced practitioner order or complex needs related to functional status, cognitive ability, or social support system  Outcome: Progressing     Problem: Knowledge Deficit  Goal: Patient/family/caregiver demonstrates understanding of disease process, treatment plan, medications, and discharge instructions  Description: Complete learning assessment and assess knowledge base.  Interventions:  - Provide teaching at level of understanding  - Provide teaching via preferred learning methods  Outcome: Progressing     Problem: POSTPARTUM  Goal: Experiences normal postpartum course  Description: INTERVENTIONS:  - Monitor maternal vital signs  - Assess uterine involution and lochia  Outcome: Progressing  Goal: Appropriate maternal -  bonding  Description: INTERVENTIONS:  - Identify family support  - Assess for appropriate maternal/infant bonding   -Encourage maternal/infant bonding opportunities  - Referral to  or  as needed  Outcome: Progressing  Goal: Establishment of infant feeding pattern  Description: INTERVENTIONS:  - Assess breast/bottle feeding  - Refer to lactation as needed  Outcome: Progressing  Goal: Incision(s), wounds(s) or drain site(s) healing without S/S of infection  Description: INTERVENTIONS  - Assess and document dressing, incision, wound bed, drain sites and surrounding tissue  - Provide patient and family education  - Perform skin care/dressing changes every day  Outcome: Progressing

## 2025-06-23 NOTE — L&D DELIVERY NOTE
Delivery Note  Ashleigh Lambert; 35 y.o. female; MRN: [id number]  Unit/Bed#: -01; Encounter: [id number]    Delivery Date & Time: 1:44 PM, 2025    Time of delivery: 2025  1:44 PM    Preop Diagnoses:    (1) Pregnancy at 40w4d gestational age    (2) Induction of labor for postdates    (3) GBS negative    (4) AMA     Postop diagnoses:    (1) 3440 g (7 lb 9.3 oz) female , apgars 8  / 9 , born 2025  1:44 PM    (2) , female    (3) GBS negative    Procedure:  Delivery - Vaginal, Spontaneous    Obstetrician/Certified Nurse Midwife:  NELA CEDENO  Anesthesia:  Epidural  QBL:  Non-Surgical QBL (mL): 50 cc    Lab Results   Component Value Date    WBC 11.16 (H) 2025    HGB 12.1 2025    HCT 34.9 2025    MCV 88 2025     2025       Admission Hgb: 12.1  Admission Plt: 211    Specimens:    (1) Cord blood    (2) Cord gases    (3) Placenta    Findings:    (1) 3440 g (7 lb 9.3 oz) female , apgars 8  / 9     (2) Intact placenta, cord with 3 vessels    (3) Blood Gases  Umbilical Cord Venous Blood Gas:  Results from last 7 days   Lab Units 25  1348   PH COV  7.339   PCO2 COV mm HG 36.9   HCO3 COV mmol/L 19.4   BASE EXC COV mmol/L -5.7*   O2 CT CD VB mL/dL 15.5   O2 HGB, VENOUS CORD % 73.0     Umbilical Cord Arterial Blood Gas:  Results from last 7 days   Lab Units 25  1348   PH COA  7.337   PCO2 COA  39.4   PO2 COA mm HG 30.8*   HCO3 COA mmol/L 20.6   BASE EXC COA mmol/L -4.7*   O2 CONTENT CORD ART ml/dl 15.2   O2 HGB, ARTERIAL CORD % 72.2       (4) Intact Perineum or laceration: Intact perineum    Complications:  None.  Dispo:  Stable. Patient tolerated the procedure well and was recovering in labor & delivery.    Brief History of Labor Course:  Ms. Ashleigh Lambert is a 35 y.o.  at 40 weeks and 4 days. She presented to labor and delivery this morning for a scheduled induction of labor secondary to postdates.  Negative GBS.  AMA.  She was 4  cm, 80%, -2 on admission.  AROM for clear fluid.  Pitocin induction of labor.  Epidural anesthesia for pain management.        Description of Procedure:    Ashleigh Lambert progressed to completely dilated @ 1131.  She pushed x ~ 23 minutes to [facility] of live Female infant.  The fetal vtx delivered spontaneously.  Nuchal Cord x 1 and Easily Reduced.  The anterior shoulder delivered atraumatically with maternal expulsive forces and the assistance of downward traction. The posterior shoulder delivered with maternal expulsive forces and the assistance of upward traction.  The remainder of the fetus delivered spontaneously.  Meconium stained fluid was noted with the expulsion of the baby.    Upon delivery, the infant was transferred to maternal abdomen.   After 30-60 seconds the umbilical cord was then doubly clamped and cut.  The infant was noted to cry spontaneously and was moving all extremities appropriately. Arterial and venous cord blood gases and cord blood was collected for analysis. These were promptly sent to the lab. In the immediate post-partum, 30 units of IV pitocin was administered, and the uterus was noted to contract down well with massage and pitocin. The placenta delivered spontaneous at 1350 and was noted to have a marginally inserted 3 vessel cord. This was sent to Pathology.    The vagina, cervix, perineum, and rectum were inspected and there was noted to be a Intact perineum.    At the conclusion of the procedure, all needle, sponge, and instrument counts were noted to be correct, verified by sumaya. Patient tolerated the procedure well and was allowed to recover in labor and delivery room with family and  before being transferred to the post-partum floor.     Ms. Lambert did well, and following the procedure was in stable condition.    Apgars: 8  and 9  at 1 and 5 minutes, respectively  Infant weight: 3440 g (7 lb 9.3 oz); 121.34    Happy Birthday, Lis !    Erika Guaman CNM  2025  3:59  PM

## 2025-06-23 NOTE — ANESTHESIA PREPROCEDURE EVALUATION
Procedure:  LABOR ANALGESIA    Relevant Problems   GYN   (+) 39 weeks gestation of pregnancy   (+) Multigravida of advanced maternal age in third trimester      MUSCULOSKELETAL   (+) Chronic midline low back pain without sciatica      NEURO/PSYCH   (+) Chronic midline low back pain without sciatica        Physical Exam    Airway     Mallampati score: II          Cardiovascular  Cardiovascular exam normal    Dental       Pulmonary  Pulmonary exam normal     Neurological      Other Findings  post-pubertal.      Anesthesia Plan  ASA Score- 2     Anesthesia Type- epidural with ASA Monitors.         Additional Monitors:     Airway Plan:            Plan Factors-    Chart reviewed.   Existing labs reviewed.                   Induction-     Postoperative Plan-     Perioperative Resuscitation Plan - Level 1 - Full Code.       Informed Consent- Anesthetic plan and risks discussed with patient.        NPO Status:  No vitals data found for the desired time range.

## 2025-06-23 NOTE — H&P
History & Physical - OB/GYN   Ashleigh Lambert 35 y.o. female MRN: [id number]  Unit/Bed#: -01 Encounter: [id number]    35 y.o. yo  at 40w4d with RAFFI of 2025, by Last Menstrual Period.    She is a patient of [facility] [place].    Chief complaint:  I am here for induction    HPI:  Contractions:  no  Fetal movement:  yes  Vaginal bleeding:   no  Leaking of fluid:  no      Pregnancy Complications:  Pregnancy Problems (from 24 to present)       Problem Noted Diagnosed Resolved    39 weeks gestation of pregnancy 12/10/2024 by JENNIE Mcdonald  No    Overview Signed 2025 12:57 PM by Shashi Antonio MD   Tdap 25         Multigravida of advanced maternal age in third trimester 2024 by Dante Espana MD  No    Overview Signed 12/10/2024  9:04 AM by JENNIE Mcdonald   Taking low dose ASA therapy for AMA, >10 years since last pregnancy, continue until 36 weeks.         39 weeks gestation of pregnancy 6/15/2025 by Naa Clark, DO  2025 by Shashi Antonio MD            PMH:  Past Medical History[1]    PSH:  Past Surgical History[2]    Social Hx:  Social History[3]       OB Hx:  OB History    Para Term  AB Living   2 1 1 0 0 1   SAB IAB Ectopic Multiple Live Births   0 0 0 0 1      # Outcome Date GA Lbr Fredy/2nd Weight Sex Type Anes PTL Lv   2 Current            1 Term 03/06/10 38w0d  3345 g (7 lb 6 oz) M Vag-Spont EPI N IDALIA       Meds:  Medications Ordered Prior to Encounter[4]    Allergies:  Allergies[5]    Labs:  ABO Grouping   Date Value Ref Range Status   06/15/2025 O  Final      Rh Factor   Date Value Ref Range Status   06/15/2025 Positive  Final     Rh Type   Date Value Ref Range Status   2024 Positive  Final     Comment:     Please note: Prior records for this patient's ABO / Rh type are not  available for additional verification.        Rh Factor   Date Value Ref Range Status   06/15/2025 Positive  Final     Rh Type   Date Value  "Ref Range Status   2024 Positive  Final     Comment:     Please note: Prior records for this patient's ABO / Rh type are not  available for additional verification.       HIV-1/HIV-2 AB   Date Value Ref Range Status   2024 Non-Reactive  Final     Hepatitis B Surface Ag   Date Value Ref Range Status   2024 Negative  Final     HEP C AB   Date Value Ref Range Status   2024 Non Reactive Non Reactive Final     RPR   Date Value Ref Range Status   2025 Non Reactive Non Reactive Final     No results found for: \"RUBELLAIGGQT\"  Glucose   Date Value Ref Range Status   2025 100 70 - 139 mg/dL Final     Comment:     According to ADA, a glucose threshold of >139 mg/dL after 50-gram  load identifies approximately 80% of women with gestational  diabetes mellitus, while the sensitivity is further increased to  approximately 90% by a threshold of >129 mg/dL.       No results found for: \"HLHFQFP3FI\"  Strep Grp B FERNANDA   Date Value Ref Range Status   2025 Negative Negative Final     Comment:     Centers for Disease Control and Prevention (CDC) and American Congress  of Obstetricians and Gynecologists (ACOG) guidelines for prevention of   group B streptococcal (GBS) disease specify co-collection of  a vaginal and rectal swab specimen to maximize sensitivity of GBS  detection. Per the CDC and ACOG, swabbing both the lower vagina and  rectum substantially increases the yield of detection compared with  sampling the vagina alone.  Penicillin G, ampicillin, or cefazolin are indicated for intrapartum  prophylaxis of  GBS colonization. Reflex susceptibility  testing should be performed prior to use of clindamycin only on GBS  isolates from penicillin-allergic women who are considered a high risk  for anaphylaxis. Treatment with vancomycin without additional testing  is warranted if resistance to clindamycin is noted.         Physical Exam:  LMP 2024 (Exact Date)     Gen: " NAD  Heart: RRR  Lungs: CTA b/l  Abdomen: gravid, non-tender, non-distended  Extremities: non-tender, no edema    Estimated Fetal Weight: 8 lbs  Presentation: VTX, confirmed via u/s    SVE:   4/80/-2    FHT:  140's, +accels, no decels, mod variability     TOCO:  none     Membranes:  Clear fluid noted after AROM    Assessment:   35 y.o.  at 40w4d weeks presents for induction of labor for being post date    Plan:   1. Admit to L&D  2. Place IV and IV fluids  3. Labs: CBC, RPR, type and screen  4. Labor management:  Start Pitocin.  Plan reviewed with patient.  Patient and spouse in agreement with plan  5. Analgesia/Epidural upon request, as appropriate.             [1]   Past Medical History:  Diagnosis Date    Depression     baby blues @ 10 months (after d/c breatfeeding) x 3 months duration - no meds or counseling    Migraine     Ovarian cyst     PID (acute pelvic inflammatory disease)     Urinary tract infection     Varicella     pt had chickenpox in childhood    Yeast infection    [2]   Past Surgical History:  Procedure Laterality Date    NO PAST SURGERIES     [3]   Social History  Tobacco Use    Smoking status: Never     Passive exposure: Never    Smokeless tobacco: Never   Vaping Use    Vaping status: Never Used   Substance Use Topics    Alcohol use: Not Currently     Comment: social    Drug use: No   [4]   No current facility-administered medications on file prior to encounter.     Current Outpatient Medications on File Prior to Encounter   Medication Sig Dispense Refill    CHOLINE PO Take by mouth      loratadine (CLARITIN REDITABS) 10 MG dissolvable tablet Take 10 mg by mouth in the morning.      magnesium (MAGTAB) 84 MG (7MEQ) TBCR Take 84 mg by mouth in the morning.      Prenatal-FeFum-FA-DHA w/o A (PRENATAL + DHA PO) Take 2 tablets by mouth in the morning with Choline     [5] No Known Allergies

## 2025-06-23 NOTE — OB LABOR/OXYTOCIN SAFETY PROGRESS
Oxytocin Safety Progress Check Note - Ashleigh Lambert 35 y.o. female MRN: [id number]    Unit/Bed#: -01 Encounter: [id number]    Dose (aleah-units/min) Oxytocin: 6 aleah-units/min  Contraction Frequency (minutes): occasional  Contraction Intensity: Mild/Moderate  Uterine Activity Characteristics: Irregular  Cervical Dilation: 4        Cervical Effacement: 80  Fetal Station: -2  Baseline Rate (FHR): 145 bpm  Fetal Heart Rate (FHT): 150 BPM  FHR Category: I       Vital Signs:   Vitals:    06/23/25 0934   BP: 123/71   Resp:    Temp: 98.5 °F (36.9 °C)       Notes/comments:   Pt uncomfortable with contractions.  Requesting epidural        Naa Clark DO 6/23/2025 10:23 AM

## 2025-06-24 VITALS
HEART RATE: 74 BPM | DIASTOLIC BLOOD PRESSURE: 56 MMHG | RESPIRATION RATE: 18 BRPM | SYSTOLIC BLOOD PRESSURE: 111 MMHG | OXYGEN SATURATION: 98 % | TEMPERATURE: 98 F

## 2025-06-24 PROBLEM — Z3A.39 39 WEEKS GESTATION OF PREGNANCY: Status: RESOLVED | Noted: 2024-12-10 | Resolved: 2025-06-24

## 2025-06-24 PROBLEM — O09.523 MULTIGRAVIDA OF ADVANCED MATERNAL AGE IN THIRD TRIMESTER: Status: RESOLVED | Noted: 2024-12-09 | Resolved: 2025-06-24

## 2025-06-24 LAB
BASOPHILS # BLD AUTO: 0.05 THOUSANDS/ÂΜL (ref 0–0.1)
BASOPHILS NFR BLD AUTO: 0 % (ref 0–1)
EOSINOPHIL # BLD AUTO: 0.14 THOUSAND/ÂΜL (ref 0–0.61)
EOSINOPHIL NFR BLD AUTO: 1 % (ref 0–6)
ERYTHROCYTE [DISTWIDTH] IN BLOOD BY AUTOMATED COUNT: 12.8 % (ref 11.6–15.1)
HCT VFR BLD AUTO: 30.5 % (ref 34.8–46.1)
HGB BLD-MCNC: 10.1 G/DL (ref 11.5–15.4)
IMM GRANULOCYTES # BLD AUTO: 0.21 THOUSAND/UL (ref 0–0.2)
IMM GRANULOCYTES NFR BLD AUTO: 2 % (ref 0–2)
LYMPHOCYTES # BLD AUTO: 1.99 THOUSANDS/ÂΜL (ref 0.6–4.47)
LYMPHOCYTES NFR BLD AUTO: 15 % (ref 14–44)
MCH RBC QN AUTO: 29.9 PG (ref 26.8–34.3)
MCHC RBC AUTO-ENTMCNC: 33.1 G/DL (ref 31.4–37.4)
MCV RBC AUTO: 90 FL (ref 82–98)
MONOCYTES # BLD AUTO: 1.03 THOUSAND/ÂΜL (ref 0.17–1.22)
MONOCYTES NFR BLD AUTO: 8 % (ref 4–12)
NEUTROPHILS # BLD AUTO: 9.96 THOUSANDS/ÂΜL (ref 1.85–7.62)
NEUTS SEG NFR BLD AUTO: 74 % (ref 43–75)
NRBC BLD AUTO-RTO: 0 /100 WBCS
PLATELET # BLD AUTO: 170 THOUSANDS/UL (ref 149–390)
PMV BLD AUTO: 10.7 FL (ref 8.9–12.7)
RBC # BLD AUTO: 3.38 MILLION/UL (ref 3.81–5.12)
TREPONEMA PALLIDUM IGG+IGM AB [PRESENCE] IN SERUM OR PLASMA BY IMMUNOASSAY: NORMAL
WBC # BLD AUTO: 13.38 THOUSAND/UL (ref 4.31–10.16)

## 2025-06-24 PROCEDURE — 85025 COMPLETE CBC W/AUTO DIFF WBC: CPT | Performed by: ADVANCED PRACTICE MIDWIFE

## 2025-06-24 PROCEDURE — NC001 PR NO CHARGE: Performed by: OBSTETRICS & GYNECOLOGY

## 2025-06-24 PROCEDURE — 99024 POSTOP FOLLOW-UP VISIT: CPT | Performed by: OBSTETRICS & GYNECOLOGY

## 2025-06-24 RX ORDER — IBUPROFEN 600 MG/1
600 TABLET, FILM COATED ORAL EVERY 6 HOURS
Start: 2025-06-24

## 2025-06-24 RX ORDER — ACETAMINOPHEN 325 MG/1
650 TABLET ORAL EVERY 4 HOURS PRN
Start: 2025-06-24

## 2025-06-24 RX ADMIN — IBUPROFEN 600 MG: 600 TABLET ORAL at 06:02

## 2025-06-24 RX ADMIN — IBUPROFEN 600 MG: 600 TABLET ORAL at 12:32

## 2025-06-24 RX ADMIN — ACETAMINOPHEN 650 MG: 325 TABLET, FILM COATED ORAL at 11:33

## 2025-06-24 RX ADMIN — DOCUSATE SODIUM 100 MG: 100 CAPSULE, LIQUID FILLED ORAL at 12:32

## 2025-06-24 NOTE — PLAN OF CARE
Problem: PAIN - ADULT  Goal: Verbalizes/displays adequate comfort level or baseline comfort level  Description: Interventions:  - Encourage patient to monitor pain and request assistance  - Assess pain using appropriate pain scale  - Administer analgesics as ordered based on type and severity of pain and evaluate response  - Implement non-pharmacological measures as appropriate and evaluate response  - Consider cultural and social influences on pain and pain management  - Notify physician/advanced practitioner if interventions unsuccessful or patient reports new pain  - Educate patient/family on pain management process including their role and importance of  reporting pain   - Provide non-pharmacologic/complimentary pain relief interventions  Outcome: Progressing     Problem: INFECTION - ADULT  Goal: Absence or prevention of progression during hospitalization  Description: INTERVENTIONS:  - Assess and monitor for signs and symptoms of infection  - Monitor lab/diagnostic results  - Monitor all insertion sites, i.e. indwelling lines, tubes, and drains  - Monitor endotracheal if appropriate and nasal secretions for changes in amount and color  - [place] appropriate cooling/warming therapies per order  - Administer medications as ordered  - Instruct and encourage patient and family to use good hand hygiene technique  - Identify and instruct in appropriate isolation precautions for identified infection/condition  Outcome: Progressing  Goal: Absence of fever/infection during neutropenic period  Description: INTERVENTIONS:  - Monitor WBC  - Perform strict hand hygiene  - Limit to healthy visitors only  - No plants, dried, fresh or silk flowers with yeung in patient room  Outcome: Progressing     Problem: SAFETY ADULT  Goal: Patient will remain free of falls  Description: INTERVENTIONS:  - Educate patient/family on patient safety including physical limitations  - Instruct patient to call for assistance with activity   -  Consider consulting OT/PT to assist with strengthening/mobility based on AM PAC & JH-HLM score  - Consult OT/PT to assist with strengthening/mobility   - Keep Call bell within reach  - Keep bed low and locked with side rails adjusted as appropriate  - Keep care items and personal belongings within reach  - Initiate and maintain comfort rounds  - Make Fall Risk Sign visible to staff  - Offer Toileting every 2 Hours, in advance of need  - Initiate/Maintain NO alarm  - Obtain necessary fall risk management equipment: non slip socks  - Apply yellow socks and bracelet for high fall risk patients  - Consider moving patient to room near nurses station  Outcome: Progressing  Goal: Maintain or return to baseline ADL function  Description: INTERVENTIONS:  -  Assess patient's ability to carry out ADLs; assess patient's baseline for ADL function and identify physical deficits which impact ability to perform ADLs (bathing, care of mouth/teeth, toileting, grooming, dressing, etc.)  - Assess/evaluate cause of self-care deficits   - Assess range of motion  - Assess patient's mobility; develop plan if impaired  - Assess patient's need for assistive devices and provide as appropriate  - Encourage maximum independence but intervene and supervise when necessary  - Involve family in performance of ADLs  - Assess for home care needs following discharge   - Consider OT consult to assist with ADL evaluation and planning for discharge  - Provide patient education as appropriate  - Monitor functional capacity and physical performance, use of AM PAC & JH-HLM   - Monitor gait, balance and fatigue with ambulation    Outcome: Progressing     Problem: DISCHARGE PLANNING  Goal: Discharge to home or other facility with appropriate resources  Description: INTERVENTIONS:  - Identify barriers to discharge w/patient and caregiver  - Arrange for needed discharge resources and transportation as appropriate  - Identify discharge learning needs (meds,  wound care, etc.)  - Arrange for interpretive services to assist at discharge as needed  - Refer to Case Management Department for coordinating discharge planning if the patient needs post-hospital services based on physician/advanced practitioner order or complex needs related to functional status, cognitive ability, or social support system  Outcome: Progressing     Problem: Knowledge Deficit  Goal: Patient/family/caregiver demonstrates understanding of disease process, treatment plan, medications, and discharge instructions  Description: Complete learning assessment and assess knowledge base.  Interventions:  - Provide teaching at level of understanding  - Provide teaching via preferred learning methods  Outcome: Progressing     Problem: POSTPARTUM  Goal: Experiences normal postpartum course  Description: INTERVENTIONS:  - Monitor maternal vital signs  - Assess uterine involution and lochia  Outcome: Progressing  Goal: Appropriate maternal -  bonding  Description: INTERVENTIONS:  - Identify family support  - Assess for appropriate maternal/infant bonding   -Encourage maternal/infant bonding opportunities  - Referral to  or  as needed  Outcome: Progressing  Goal: Establishment of infant feeding pattern  Description: INTERVENTIONS:  - Assess breast/bottle feeding  - Refer to lactation as needed  Outcome: Progressing  Goal: Incision(s), wounds(s) or drain site(s) healing without S/S of infection  Description: INTERVENTIONS  - Assess and document dressing, incision, wound bed, drain sites and surrounding tissue  - Provide patient and family education  - Perform skin care/dressing changes every day  Outcome: Progressing

## 2025-06-24 NOTE — DISCHARGE SUMMARY
Discharge Summary - OB/GYN   Name: Ashleigh Lambert 35 y.o. female I MRN: [id number]  Unit/Bed#: -01 I Date of Admission: 2025   Date of Service: 2025 I Hospital Day: 1    ADMISSION  Patient of: [facility] [place]  Admission Date: 2025   Admitting Attending: Dr. Naa Clark DO  Admitting Diagnoses: Problem List[1]    DELIVERY  Delivery Method: Vaginal, Spontaneous   Delivery Date and Time: 2025 1:44 PM  Delivery Attending: Erika Guaman    DISCHARGE  Discharge Date: 25  Discharge Attending: Dr. Naa Clark DO  Discharge Diagnosis:   Same, Delivered    Clinical course: Admission to Delivery  Ashleigh Lambert is a 35 y.o.  who was admitted at 40w4d for scheduled IOL.    Reason for induction: Elective.     Induction method:  , ,None Elective. Pitocin    For pain control in labor, pt received epidural.  She progressed to complete and began pushing.    Delivery  Route of Delivery: Vaginal, Spontaneous        Anesthesia: Epidural,   QBL: Non-Surgical QBL (mL): 50      QBL:        Delivery: Vaginal, Spontaneous at 2025 1:44 PM  Laceration: Perineal: None Repaired?   N/a    Baby's Weight: 3440 g (7 lb 9.3 oz); 121.34    Apgar scores: 8  and 9  at 1 and 5 minutes, respectively    Clinical Course: Post-Delivery:  The post delivery course was unremarkable.    On the day of discharge, the patient was ambulating, voiding spontaneously, tolerating oral intake, and hemodynamically stable. She was able to reasonably perform all ADLs. She had appropriate bowel function. Pain was well-controlled. She was discharged home on postpartum/postop day #1 without complications. Patient was instructed to follow up with her OB as an outpatient and was given appropriate warnings to call her provider with problems or concerns.    Pertinent lab findings included:   Blood type O+.     Last three Hgb values:  Lab Results   Component Value Date    HGB 10.1 (L) 2025    HGB 12.1  2025    HGB 12.4 06/15/2025        Problem-specific follow-up plans included the following:  Assessment & Plan   (spontaneous vaginal delivery)  Reg diet  Ambulate  Desires d/c home      Discharge med list:  Contraception: TBD at PP visit     Medication List      START taking these medications     acetaminophen 325 mg tablet; Commonly known as: TYLENOL; Take 2 tablets   (650 mg total) by mouth every 4 (four) hours as needed for mild pain   ibuprofen 600 mg tablet; Commonly known as: MOTRIN; Take 1 tablet (600   mg total) by mouth every 6 (six) hours     CONTINUE taking these medications     CHOLINE PO   loratadine 10 MG dissolvable tablet; Commonly known as: CLARITIN   REDITABS   magnesium 84 MG (7MEQ) Tbcr; Commonly known as: MAGTAB   PRENATAL + DHA PO     STOP taking these medications     UNISOM PO       Condition at discharge:   good     Disposition:   Home    Planned Readmission:   No    Discharge Statement:  I have spent a total time of 20 minutes in caring for this patient on the day of the visit/encounter. .       [1]   Patient Active Problem List  Diagnosis    Chronic midline low back pain without sciatica     (spontaneous vaginal delivery)

## 2025-06-24 NOTE — PROGRESS NOTES
Progress Note - OB/GYN   Name: Ashleigh Lambert 35 y.o. female I MRN: [id number]  Unit/Bed#: -01 I Date of Admission: 2025   Date of Service: 2025 I Hospital Day: 1    Assessment & Plan   (spontaneous vaginal delivery)  Reg diet  Ambulate  Desires d/c home    OB Post-Partum Progress Note  Subjective   Post delivery. Patient is doing well. Lochia WNL. Pain well controlled.     Pain: yes, cramping, improved with meds  Tolerating PO: yes  Voiding: +  Flatus: +  BM: +  Ambulating: yes  Breastfeeding:  +  Chest pain: no  Shortness of breath: no  Leg pain: no  Lochia: WNL    Objective :  Temp:  [97.7 °F (36.5 °C)-98.5 °F (36.9 °C)] 97.9 °F (36.6 °C)  HR:  [61-78] 61  BP: ()/(52-71) 105/55  Resp:  [18-20] 18  SpO2:  [96 %-99 %] 96 %  O2 Device: None (Room air)    Physical Exam  Constitutional:       Appearance: Normal appearance.   HENT:      Head: Normocephalic and atraumatic.   Pulmonary:      Effort: Pulmonary effort is normal.   Abdominal:      General: Abdomen is flat.      Palpations: Abdomen is soft.   Genitourinary:     Comments: Fundus firm    Skin:     General: Skin is warm and dry.     Neurological:      Mental Status: She is alert and oriented to person, place, and time.     Psychiatric:         Mood and Affect: Mood normal.         Behavior: Behavior normal.         Judgment: Judgment normal.           Lab Results: I have reviewed the following results:  Lab Results   Component Value Date    WBC 13.38 (H) 2025    HGB 10.1 (L) 2025    HCT 30.5 (L) 2025    MCV 90 2025     2025

## 2025-06-24 NOTE — UTILIZATION REVIEW
"NOTIFICATION OF INPATIENT ADMISSION   MATERNITY/DELIVERY AUTHORIZATION REQUEST   SERVICING FACILITY:   [facility]  Parent Child Health - L&D, , NICU  [address] [facility] [facility] [place] [address]  Tax ID: [id number]  NPI: [id number] ATTENDING PROVIDER:  Attending Name and NPI#: Naa Clark Do [[id number]]  Address: [address] [facility] [place] [address]  Phone: [phone]     ADMISSION INFORMATION:  Place of Service: Inpatient [facility]  Place of Service Code: 21  Inpatient Admission Date/Time: 25  8:13 AM  Discharge Date/Time: No discharge date for patient encounter.  Admitting Diagnosis Code/Description:  No admission diagnoses are documented for this encounter.     Mother: Ashleigh Lambert 1990 Estimated Date of Delivery: 25  Delivering clinician: Erika Guaman   OB History          2    Para   2    Term   2            AB        Living   2         SAB        IAB        Ectopic        Multiple   0    Live Births   2               [place] Name & MRN:   Information for the patient's :  Fausto, Baby Girl (Ashleigh) [[id number]]   [place] Delivery Information:  Sex: female  Delivered 2025 1:44 PM by Vaginal, Spontaneous; Gestational Age: 40w4d     Measurements:  Weight: 7 lb 9.3 oz (3440 g);  Height: 20.75\"    APGAR 1 minute 5 minutes 10 minutes   Totals: 8 9       UTILIZATION REVIEW CONTACT:  Laurie Bustamante, Utilization   Network Utilization Review Department  Phone: [phone]  Fax [phone]  Email: [email or web address]  Contact for approvals/pending authorizations, clinical reviews, and discharge.     PHYSICIAN ADVISORY SERVICES:  Medical Necessity Denial & [place] Review  Phone: [phone]  Fax: [phone]  Email: [email or web address]     DISCHARGE SUPPORT TEAM:  For Patients Discharge Needs & Updates  Phone: [phone] opt. 2 Fax: [phone]  Email: " [email or web address]

## 2025-06-25 ENCOUNTER — TELEPHONE (OUTPATIENT)
Dept: OBGYN CLINIC | Facility: CLINIC | Age: 35
End: 2025-06-25

## 2025-06-25 ENCOUNTER — NURSE TRIAGE (OUTPATIENT)
Age: 35
End: 2025-06-25

## 2025-06-25 NOTE — UTILIZATION REVIEW
NOTIFICATION OF ADMISSION DISCHARGE   This is a Notification of Discharge from [facility]. Please be advised that this patient has been discharge from our facility. Below you will find the admission and discharge date and time including the patient’s disposition.   UTILIZATION REVIEW CONTACT:  Utilization Review Assistants  Network Utilization Review Department  Phone: [phone] x carefully listen to the prompts. All voicemails are confidential.  Email: [email or web address]     ADMISSION INFORMATION  PRESENTATION DATE: 6/23/2025  7:34 AM  OBERVATION ADMISSION DATE: N/A  INPATIENT ADMISSION DATE: 6/23/25  8:13 AM   DISCHARGE DATE: 6/24/2025  4:20 PM   DISPOSITION:Home/Self Care    Network Utilization Review Department  ATTENTION: Please call with any questions or concerns to [phone] and carefully listen to the prompts so that you are directed to the right person. All voicemails are confidential.   For Discharge needs, contact Care Management DC Support Team at [phone] opt. 2  Send all requests for admission clinical reviews, approved or denied determinations and any other requests to dedicated fax number below belonging to the campus where the patient is receiving treatment. List of dedicated fax numbers for the Facilities:  FACILITY NAME UR FAX NUMBER   ADMISSION DENIALS (Administrative/Medical Necessity) [phone]   DISCHARGE SUPPORT TEAM ([facility]) [phone]   PARENT CHILD HEALTH (Maternity/NICU/Pediatrics) [phone]   General acute hospital [phone]   [facility] [phone]   [facility] [phone]   [facility] [phone]   [facility] [phone]   [facility] [phone]   [facility] [phone]   [facility] [phone]   [facility]  [facility] [phone]   [facility] [phone]   [facility] [phone]   [facility] [phone]

## 2025-06-25 NOTE — TELEPHONE ENCOUNTER
Transfer from [place], reporting patient c/o itching at incision site.   25. After discussing with patient, her itching is in her back where her epidural was. No bruising or swelling or rash. Advised this is normal healing and possible irritation from the tape, but to call back with any new symptoms.

## 2025-06-27 ENCOUNTER — TELEPHONE (OUTPATIENT)
Dept: OBGYN CLINIC | Facility: CLINIC | Age: 35
End: 2025-06-27

## 2025-06-27 ENCOUNTER — NURSE TRIAGE (OUTPATIENT)
Age: 35
End: 2025-06-27

## 2025-06-27 NOTE — TELEPHONE ENCOUNTER
Regarding: sharp shooting pains after delivery  ----- Message from Ambika LE sent at 6/27/2025  5:16 PM EDT -----  Patient delivered 6/23/25 vaginally and is having sharp stabbing pains in lower abdomin.

## 2025-06-27 NOTE — TELEPHONE ENCOUNTER
"REASON FOR CONVERSATION: Postpartum Care    SYMPTOMS: pelvic pain    OTHER HEALTH INFORMATION:  on --mild pelvic pain that comes and goes. No aggravating factors. States definitely not cramping. Vaginal bleeding has been very mild. Denies fever, chills or foul vaginal odor.     PROTOCOL DISPOSITION: Home Care    CARE ADVICE PROVIDED: Monitor; call with worsening or if changes to bleeding or vaginal odor occur. Patient verbalized understanding    PRACTICE FOLLOW-UP: n/a      Reason for Disposition   Mild abdominal pain    Answer Assessment - Initial Assessment Questions  1. LOCATION: \"Where does it hurt?\"       Pelvic area  2. RADIATION: \"Does the pain shoot anywhere else?\" (e.g., chest, back)      denies  3. ONSET: \"When did the pain begin?\" (Minutes, hours or days ago)       yesterday  4. DELIVERY DATE: \"When was your delivery date?\" \"Vaginal delivery or ?\"          5. ONSET: \"Gradual or sudden onset?\"      gradual  6. PATTERN \"Does the pain come and go, or has it been constant since it started?\"  (Note: most serious pain is constant and it progresses)       Comes and goes   7. SEVERITY: \"How bad is the pain?\" \"What does it keep you from doing?\"       mild  8. FEVER: \"Do you have a fever?\" If Yes, ask: \"What is your temperature, how was it measured, and when did it start?\"      denies  9. VAGINAL BLEEDING: \"Describe any vaginal bleeding you are having.\" (e.g., amount; color; odor)      light  10. OTHER SYMPTOMS: \"Do you have any other symptoms?\" (e.g., back pain, diarrhea, fever, urination pain, vaginal discharge, vomiting)        denies    Protocols used: Postpartum - Abdominal Pain-Adult-AH    "

## 2025-06-27 NOTE — TELEPHONE ENCOUNTER
Post Partum Follow-up -Voicemail received, left a message for patient to call back and sent message through [employer].     POSTPARTUM PHONE CALL ASSESSMENT    Date of Delivery: 25  Delivering Provider: Sarah Guaman   Mode:   Delivery Notes/Complications:   Admitted at 40w4d for scheduled IOL.    Do you still have bleeding/pain? If so, how much/how severe?    Regular BMs/Urination?   Breastfeeding/Formula/Both?   How are you doing emotionally?    If struggling, obtain a EPDS Score:   Do you have any other questions or concerns for us or your provider?   Have you scheduled the pediatrician appointment with pediatrician?   Do you have a postpartum visit scheduled? yes   Date scheduled: 7/15/25 Provider: Chriss

## 2025-07-01 PROCEDURE — 88307 TISSUE EXAM BY PATHOLOGIST: CPT | Performed by: PATHOLOGY

## 2025-07-10 ENCOUNTER — TELEPHONE (OUTPATIENT)
Dept: OBGYN CLINIC | Facility: CLINIC | Age: 35
End: 2025-07-10

## 2025-07-10 ENCOUNTER — NURSE TRIAGE (OUTPATIENT)
Age: 35
End: 2025-07-10

## 2025-07-10 ENCOUNTER — PATIENT MESSAGE (OUTPATIENT)
Age: 35
End: 2025-07-10

## 2025-07-10 DIAGNOSIS — M79.604 PAIN IN BOTH LOWER EXTREMITIES: Primary | ICD-10-CM

## 2025-07-10 DIAGNOSIS — M79.605 PAIN IN BOTH LOWER EXTREMITIES: Primary | ICD-10-CM

## 2025-07-10 NOTE — TELEPHONE ENCOUNTER
Reason for Disposition  • Patient sounds very sick or weak to the triager    Protocols used: Postpartum - Leg Pain-Adult-AH

## 2025-07-10 NOTE — TELEPHONE ENCOUNTER
"REASON FOR CONVERSATION: Postpartum Complications    SYMPTOMS: left calve pain after delivery     OTHER HEALTH INFORMATION: Pt calling in saying she's PP from 25 , pt c/o having soreness in both legs bilaterally, but more so in the left leg. Pt saying that she has pain 2-3/10 aching pain at the top of her calve below knee on the left leg and \"tightness\". Pt denies redness, or swelling. Pt reporting chest pain at times, that is sporadic that comes and goes but pt denies chest pain now.    Recommendation rest, increase hydration, can alternate tylenol and motion     Epic Secure Chat sent to Dr Mirza- on call   Epic Secure Chat received: Go to ED, and notify ED that she's having intermittent chest pain.     Placed call to patient to notify her of Dr Mirza's recommendations, no answer, left a non detailed voice message to call back with phone number provided [phone]    [employer] message sent as well.       PROTOCOL DISPOSITION: Go to ED now    CARE ADVICE PROVIDED: Pt is provided care advice, and is notified when to call back, pt verbalized understanding.    PRACTICE FOLLOW-UP: n/a       Answer Assessment - Initial Assessment Questions  1. ONSET: \"When did the pain start?\"       A couple of weeks ago.   2. LOCATION: \"Where is the pain located?\"       At the top of the left calve below the knee   3. PAIN: \"How bad is the pain?\"  (Scale 1-10; or mild, moderate, severe)      Pain is mild 2-3/10 \"discomfort aching feeling.   4. WORK OR EXERCISE: \"Has there been any recent work or exercise that involved this part of the body?\"       Light stretching   5. CAUSE: \"What do you think is causing the leg pain?\"      Pt unsure   6. OTHER SYMPTOMS: \"Do you have any other symptoms?\" (e.g., chest pain, back pain, breathing difficulty, swelling, rash, fever, numbness, weakness)      Pt reporting upper back pain 5/10 sharp/aching at times, between shoulder blades, pt denies upper back pain now. lower back pain 5/10 that both " "comes and goes aching. Pt reporting chest pain at times, that is sporadic that comes and goes but pt denies chest pain now.  Pt denies breathing difficulty, swelling, numbness, weakness, fever       7. DELIVERY DATE: \"When was your delivery date?\" \"Vaginal delivery or ?\"      25    Protocols used: Postpartum - Leg Pain-Adult-AH    "

## 2025-07-10 NOTE — TELEPHONE ENCOUNTER
Will order STAT bilateral VAS Venous Duplex.    Talked with Jaymie, Clinical staff in our office. She will help in calling Scheduling to arrange same day appointment for patient today and then call patient to let her know.

## 2025-07-10 NOTE — TELEPHONE ENCOUNTER
Patient called back, reviewed provider recommendations to go to ED. She reports she has no persist chest pain, that it only lasted about 15sec last night and it could have been heartburn, and she has no shortness of breath.  She doesn't want to go to ED and take her infant.  Will review with provider if there is any outpt testing she can do.  [facility] Dr. Mirza- We will try to arrange an out patient lower extremity study to rule out DVT today. Someone from our office will call her with the appointment time and place

## 2025-07-10 NOTE — TELEPHONE ENCOUNTER
Attempted to reach patient. Left voicemail letting her know the office will be reaching out to schedule outpatient testing.

## 2025-07-10 NOTE — TELEPHONE ENCOUNTER
I called [facility] scheduling to schedule a lower venus duplex STAT for the patient per Dr. Mirza, I was able to schedule this appointment for tomorrow Friday 7/11/25 at 3pm. I called Dr. Mirza back asking if this was okay. Dr Mirza said if pt does have any shortness of breath or chest pain to go to the ER.The patient verbalized understanding if she has shortness of breath or chest pain today that Dr. Mirza recommends going to ER.

## 2025-07-11 ENCOUNTER — TELEPHONE (OUTPATIENT)
Dept: OBGYN CLINIC | Facility: CLINIC | Age: 35
End: 2025-07-11

## 2025-07-11 ENCOUNTER — HOSPITAL ENCOUNTER (OUTPATIENT)
Dept: NON INVASIVE DIAGNOSTICS | Age: 35
Discharge: HOME | End: 2025-07-11
Attending: OBSTETRICS & GYNECOLOGY
Payer: COMMERCIAL

## 2025-07-11 DIAGNOSIS — M79.605 PAIN IN BOTH LOWER EXTREMITIES: ICD-10-CM

## 2025-07-11 DIAGNOSIS — M79.604 PAIN IN BOTH LOWER EXTREMITIES: ICD-10-CM

## 2025-07-11 PROCEDURE — 93970 EXTREMITY STUDY: CPT | Performed by: SURGERY

## 2025-07-11 PROCEDURE — 93970 EXTREMITY STUDY: CPT

## 2025-07-11 NOTE — TELEPHONE ENCOUNTER
I called and left a message for the patient to call back to go over her test results. I will call the patient back before I leave for the day. I left my teams phone number so the patient could call me back.      The patient call back five minutes later and I was able to go over the test results and Dr. Mirza's recommendations.

## 2025-07-15 ENCOUNTER — DOCUMENTATION (OUTPATIENT)
Dept: OBGYN CLINIC | Facility: CLINIC | Age: 35
End: 2025-07-15

## 2025-07-15 ENCOUNTER — POSTPARTUM VISIT (OUTPATIENT)
Dept: OBGYN CLINIC | Facility: CLINIC | Age: 35
End: 2025-07-15

## 2025-07-15 ENCOUNTER — NURSE TRIAGE (OUTPATIENT)
Age: 35
End: 2025-07-15

## 2025-07-15 VITALS
HEIGHT: 67 IN | WEIGHT: 150 LBS | BODY MASS INDEX: 23.54 KG/M2 | DIASTOLIC BLOOD PRESSURE: 60 MMHG | SYSTOLIC BLOOD PRESSURE: 102 MMHG

## 2025-07-15 DIAGNOSIS — N61.0 ACUTE MASTITIS OF RIGHT BREAST: Primary | ICD-10-CM

## 2025-07-15 PROCEDURE — 99024 POSTOP FOLLOW-UP VISIT: CPT | Performed by: OBSTETRICS & GYNECOLOGY

## 2025-07-15 RX ORDER — DICLOXACILLIN SODIUM 500 MG/1
500 CAPSULE ORAL 4 TIMES DAILY
Qty: 40 CAPSULE | Refills: 0 | Status: SHIPPED | OUTPATIENT
Start: 2025-07-15 | End: 2025-07-25

## 2025-07-23 ENCOUNTER — OFFICE VISIT (OUTPATIENT)
Dept: POSTPARTUM | Facility: CLINIC | Age: 35
End: 2025-07-23
Payer: COMMERCIAL

## 2025-07-23 PROCEDURE — 99404 PREV MED CNSL INDIV APPRX 60: CPT | Performed by: PEDIATRICS

## 2025-07-23 NOTE — PATIENT INSTRUCTIONS
-Marilia meeting with you and your sweet family today!   -Continue to nurse Lis on demand. Watch for signs throughout the feeding that baby is effectively removing milk. You will feel strong tugging, hear swallowing and will feel your breasts get softer after nursing.   -No need to pump if baby is latching and feeding well at the breast on demand.   -Pump only as needed for missed feedings at the breast or for uncomfortable engorgement, utilize flange fit and settings that are comfortable for you, pumping should not be painful!   --Cold compresses, like a bag of peas applied over a thin blanket or towel to the breast, is recommended for breast comfort and decreasing inflammation in the breast. You can also take ibuprofen as needed and consider a daily probiotic.  The probiotic should contain Limosilactobacillus fermentum (formerly classified as Lactobacillus fermentum) or, preferably, Ligilactobacillus salivarius (formerly classified as Lactobacillus salivarius) strain   - Storing and Thawing Breast Milk  [facility] Breastfeeding Support ([place]da.gov)    -Please call or scheduled a follow up appointment with lactation as needed for questions or concerns you may have.

## 2025-07-23 NOTE — PROGRESS NOTES
"INITIAL BREAST FEEDING EVALUATION    Informant/Relationship: Ashleigh (mom/self) and Antoni (FOB)     Discussion of General Lactation Issues: Ashleigh is noticing Lis will root, is acting like she can't find it and is getting frustrated. Ashleigh will switch her back and forth until she finally does latch. This happens various times throughout the day - but typically in the evenings. Once Lis latches it is comfortable for Mom and she is able to feed comfortably.     Ashleigh did recently felt she had a \"clogged duct\" on the right breast, it started before having the baby. It came back when her milk came in and she experienced redness, pain, and fever.     Infant, Lis is 30 days old today.        History:  Fertility Problem:no  Breast changes:yes - enlargement, color changes, tenderness   : elective induction, progressed well. 6 hour labor, pushed for 10 minutes.   Full term:yes - 40 and 4    labor:no  First nursing/attempt < 1 hour after birth:yes - this was a good latch per Mom   Skin to skin following delivery:yes - immediately   Breast changes after delivery:yes - 3 days postpartum   Rooming in (infant in room with mother with exception of procedures, eg. Circumcision: yes - entire stay   Blood sugar issues:no  NICU stay:no  Jaundice:no  Phototherapy:no  Supplement given: (list supplement and method used as well as reason(s):no    Past Medical History[1]    Current Medications[2]    No Known Allergies    Social History     Substance and Sexual Activity   Drug Use No       Social History     Interval Breastfeeding History:    Frequency of breast feeding: on demand, every couple hours, she slept 7 hours last night.   Does mother feel breastfeeding is effective: Yes  Does infant appear satisfied after nursing:Yes  Stooling pattern normal: Yes  Urinating frequently:Yes  Using shield or shells: No    Alternative/Artificial Feedings:   Bottle: Yes, Lansinoh bottle, slow flow nipple. FOB is pacing " "the feedings   Cup: No  Syringe/Finger: No           Formula Type: no                     Amount: n/a            Breast Milk:                      Amount: 3-4 oz             Frequency Q 3 x total   Elimination Problems: No      Equipment:  Nipple Shield             Type: n/a             Size: n/a             Frequency of Use: n/a  Pump            Type: Spectra S1             Frequency of Use: 1 x per day   Shells            Type: n/a            Frequency of use: n/a    Equipment Problems: no    Mom:  Breast: Normal  Nipple Assessment in General: Normal: elongated/eraser, no discoloration and no damage noted.  Mother's Awareness of Feeding Cues                 Recognizes: Yes                  Verbalizes: Yes  Support System: good support, FOB   History of Breastfeeding: Nursed her first child, 15 year old, she doesn't recall any issues but she does recall the right breast just \"stopped\" producing.   Changes/Stressors/Violence: Lis isn't always latching easily, she may also latch for a few seconds and then come off the breast crying.   Concerns/Goals: Ashleigh desires to breastfeed for at least 12 mo.     Problems with Mom: hx mastitis, clog in the right breast     Physical Exam  Constitutional:       Appearance: Normal appearance.   HENT:      Head: Normocephalic.   Pulmonary:      Effort: Pulmonary effort is normal.     Musculoskeletal:         General: Normal range of motion.      Cervical back: Normal range of motion.     Neurological:      General: No focal deficit present.      Mental Status: She is alert and oriented to person, place, and time.     Skin:     General: Skin is warm.      Capillary Refill: Capillary refill takes less than 2 seconds.     Psychiatric:         Mood and Affect: Mood normal.         Behavior: Behavior normal.         Thought Content: Thought content normal.         Judgment: Judgment normal.         Infant:  Behaviors: Alert  Color: Pink  Birth weight: 3440 g   Current weight: 4230 g "     Problems with infant: none       General Appearance:  Alert, active, no distress                            Head:  Normocephalic, AFOF, sutures opposed                            Eyes:   Conjunctiva clear, no drainage                            Ears:   Normally placed, no anomolies                           Nose:   Septum intact, no drainage or erythema                          Mouth:  No lesions. Tongue is mid mouth at rest, extends past her lip, lateralizes well. She does not establish a sucking rhythm on my finger today. Appears much more effective on the breast. Tongue is rounded on manual lift, no visible frenulum, connects to the floor of the mouth and mid tongue.                    Neck:  increased tension when turning side to side, symmetrical, trachea midline                Respiratory:  No grunting, flaring, retractions, breath sounds clear and equal           Cardiovascular:  Regular rate and rhythm. No murmur. Adequate perfusion/capillary refill. Femoral pulse present                  Abdomen:    Soft, non-tender, no masses, bowel sounds present, no HSM            Genitourinary:  Normal female genitalia, anus patent                         Spine:   No abnormalities noted       Musculoskeletal:   Full range of motion         Skin/Hair/Nails:   Skin warm, dry, and intact, no rashes or abnormal dyspigmentation or lesions               Neurologic:   No abnormal movement, tone appropriate for gestational age    [place] Latch:  Efficiency:               Lips Flanged: Yes              Depth of latch: narrow, but wider with manual adjustments               Audible Swallow: Yes              Visible Milk: Yes              Wide Open/ Asymmetrical: Yes              Suck Swallow Cycle: Breathing: yes, Coordinated: yes  Nipple Assessment after latch: Normal: elongated/eraser, no discoloration and no damage noted.  Latch Problems: None. Discussed how to use areolar compression and hand expression to help Evenly  focus to latch.     Position:  Infant's Ergonomics/Body               Body Alignment: Yes               Head Supported: Yes               Close to Mom's body/ Lifted/ Supported: Yes               Mom's Ergonomics/Body: Yes                           Supported: Yes                           Sitting Back: Yes                           Brings Baby to her breast: Yes  Positioning Problems: Discussed importance of proper alignment       Education:  Reviewed Latch: importance of deep latch without pain.   Reviewed Positioning for Dyad: proper alignment and head angle when positioning at the breast   Reviewed Frequency/Supply & Demand: offer the breast at each feeding, pump if baby is not latching and effective transferring milk.   Reviewed Infant:Cues and varied States of Awareness: watch for hunger cues, feed on demand. If baby seems satisfied at the breast (calm, relaxed sleeping, breasts are softer) no need to pump or supplement   Reviewed Infant Elimination: goal of 6+ wets and 2-3 stools per day   Reviewed Alternative/Artificial Feedings: paced bottle feeding technique demonstrated  Reviewed Mom/Breast care: gentle handling of the breast at all times,  as well as tips for healing sore nipples.    Reviewed Equipment: Hand pump and electric pump general guidance, Discussed proper flange fit, how to measure        Plan:      Reassurance provided that baby is growing well at this time. Cont with good positioning technique and watch for signs of effective feeding. Pump only if wanting to replace feeding at the breast with bottle feeding or if latching becomes painful. Save only small volumes, 2-3 oz, daily if desired. Gentle handling of the breast at all times to preserve integrity.  Contact [facility] for breastfeeding support as needed or ongoing concerns with latching comfort and milk transfer.     I have spent 60 minutes with Patient and family today in which greater than 50% of this time was spent in  counseling/coordination of care regarding Patient and family education.                [1]   Past Medical History:  Diagnosis Date    Depression     baby blues @ 10 months (after d/c breatfeeding) x 3 months duration - no meds or counseling    Migraine     Ovarian cyst     PID (acute pelvic inflammatory disease)     Urinary tract infection     Varicella     pt had chickenpox in childhood    Yeast infection    [2]   Current Outpatient Medications:     acetaminophen (TYLENOL) 325 mg tablet, Take 2 tablets (650 mg total) by mouth every 4 (four) hours as needed for mild pain, Disp: , Rfl:     CHOLINE PO, Take by mouth, Disp: , Rfl:     dicloxacillin (DYNAPEN) 500 MG capsule, Take 1 capsule (500 mg total) by mouth 4 (four) times a day for 10 days, Disp: 40 capsule, Rfl: 0    ibuprofen (MOTRIN) 600 mg tablet, Take 1 tablet (600 mg total) by mouth every 6 (six) hours, Disp: , Rfl:     LINOLEIC ACID-SUNFLOWER OIL PO, Take by mouth, Disp: , Rfl:     loratadine (CLARITIN REDITABS) 10 MG dissolvable tablet, Take 10 mg by mouth in the morning., Disp: , Rfl:     magnesium (MAGTAB) 84 MG (7MEQ) TBCR, Take 84 mg by mouth in the morning., Disp: , Rfl:     Prenatal-FeFum-FA-DHA w/o A (PRENATAL + DHA PO), Take 2 tablets by mouth in the morning with Choline, Disp: , Rfl:

## 2025-07-24 NOTE — PROGRESS NOTES
I have reviewed the notes, assessments, and/or procedures performed by Susy Marroquin RN, IBCLC, I concur with her/his documentation of Ashleigh Lee MD 07/23/25

## 2025-08-01 ENCOUNTER — VBI (OUTPATIENT)
Dept: ADMINISTRATIVE | Facility: OTHER | Age: 35
End: 2025-08-01

## 2025-08-05 ENCOUNTER — TELEPHONE (OUTPATIENT)
Age: 35
End: 2025-08-05

## 2025-08-05 PROBLEM — N94.6 DYSMENORRHEA: Status: ACTIVE | Noted: 2025-08-05

## 2025-08-05 PROBLEM — R01.1 CARDIAC MURMUR, UNSPECIFIED: Status: ACTIVE | Noted: 2025-08-05

## 2025-08-05 PROBLEM — G89.29 CHRONIC HEADACHE: Status: ACTIVE | Noted: 2025-08-05

## 2025-08-05 PROBLEM — R51.9 CHRONIC HEADACHE: Status: ACTIVE | Noted: 2025-08-05

## 2025-08-05 PROBLEM — F32.81 PREMENSTRUAL DYSPHORIC DISORDER: Status: ACTIVE | Noted: 2025-08-05

## 2025-08-05 PROBLEM — R07.81 PLEURODYNIA: Status: ACTIVE | Noted: 2025-08-05

## 2025-08-05 PROBLEM — F33.9 MAJOR DEPRESSIVE DISORDER, RECURRENT, UNSPECIFIED (HCC): Status: ACTIVE | Noted: 2025-08-05

## 2025-08-05 PROBLEM — F41.9 ANXIETY DISORDER, UNSPECIFIED: Status: ACTIVE | Noted: 2025-08-05

## 2025-08-05 PROBLEM — M62.838 MUSCLE SPASMS OF NECK: Status: ACTIVE | Noted: 2025-08-05

## 2025-08-07 ENCOUNTER — EVALUATION (OUTPATIENT)
Dept: PHYSICAL THERAPY | Facility: CLINIC | Age: 35
End: 2025-08-07
Attending: STUDENT IN AN ORGANIZED HEALTH CARE EDUCATION/TRAINING PROGRAM
Payer: COMMERCIAL

## 2025-08-07 DIAGNOSIS — R19.8 ABDOMINAL WEAKNESS: ICD-10-CM

## 2025-08-07 PROCEDURE — 97530 THERAPEUTIC ACTIVITIES: CPT | Performed by: PHYSICAL THERAPIST

## 2025-08-07 PROCEDURE — 97161 PT EVAL LOW COMPLEX 20 MIN: CPT | Performed by: PHYSICAL THERAPIST

## 2025-08-11 ENCOUNTER — OB ABSTRACT (OUTPATIENT)
Age: 35
End: 2025-08-11

## 2025-08-11 ENCOUNTER — OFFICE VISIT (OUTPATIENT)
Age: 35
End: 2025-08-11
Payer: COMMERCIAL

## 2025-08-13 ENCOUNTER — OFFICE VISIT (OUTPATIENT)
Age: 35
End: 2025-08-13

## 2025-08-13 VITALS
HEIGHT: 67 IN | SYSTOLIC BLOOD PRESSURE: 98 MMHG | DIASTOLIC BLOOD PRESSURE: 70 MMHG | WEIGHT: 148.2 LBS | BODY MASS INDEX: 23.26 KG/M2

## 2025-08-14 ENCOUNTER — TELEPHONE (OUTPATIENT)
Age: 35
End: 2025-08-14